# Patient Record
Sex: FEMALE | Race: AMERICAN INDIAN OR ALASKA NATIVE | Employment: OTHER | URBAN - METROPOLITAN AREA
[De-identification: names, ages, dates, MRNs, and addresses within clinical notes are randomized per-mention and may not be internally consistent; named-entity substitution may affect disease eponyms.]

---

## 2017-01-11 ENCOUNTER — HOSPITAL ENCOUNTER (OUTPATIENT)
Dept: PHYSICAL THERAPY | Age: 72
Discharge: HOME | End: 2017-01-11
Payer: MEDICARE

## 2017-01-11 DIAGNOSIS — M54.2 NECK PAIN: ICD-10-CM

## 2017-01-11 PROCEDURE — 97110 THERAPEUTIC EXERCISES: CPT

## 2017-01-11 PROCEDURE — 97162 PT EVAL MOD COMPLEX 30 MIN: CPT

## 2017-01-11 PROCEDURE — G8981 BODY POS CURRENT STATUS: HCPCS

## 2017-01-11 PROCEDURE — G8982 BODY POS GOAL STATUS: HCPCS

## 2017-01-11 NOTE — PROGRESS NOTES
Miles Whitt  : 1945 [address] [facility],[address]  [address] [place] [address].  [phone]   [phone]        OUTPATIENT PHYSICAL THERAPY:Initial Assessment 2017      ICD-10: Treatment Diagnosis: Cervicalgia (M54.2) , Other muscle spasm (P97.862)  Precautions/Allergies:   Review of patient's allergies indicates not on file. Fall Risk Score: 7 (? 5 = High Risk)  MD Orders: Eval and Treat  MEDICAL/REFERRING DIAGNOSIS:  Neck pain [M54.2]   DATE OF ONSET: week before [place]   REFERRING PHYSICIAN: Jef Campbell MD  RETURN PHYSICIAN APPOINTMENT: tbd      INITIAL ASSESSMENT:  Ms. Ilene Crowder presents to therapy with pain in the L side of the neck due to a fall outside her home. Pt stated the pain started when she fell and has not been relieved since the fall. Pt is having difficulty driving as well as with AROM of the neck for other ADLs and functional activities. Pt would benefit from skilled PT for above deficits to return to prior level of function painfree. PROBLEM LIST (Impacting functional limitations):  1. Decreased Strength  2. Decreased ADL/Functional Activities  3. Increased Pain  4. Decreased Activity Tolerance  5. Decreased Flexibility/Joint Mobility  6. Decreased [place] with Home Exercise Program INTERVENTIONS PLANNED:  1. Balance Exercise  2. Cold  3. Electrical Stimulation  4. Heat  5. Home Exercise Program (HEP)  6. Manual Therapy  7. Range of Motion (ROM)  8. Therapeutic Activites  9. Therapeutic Exercise/Strengthening  10. Ultrasound (US)   TREATMENT PLAN:  Effective Dates: 17 TO 17. Frequency/Duration: 2 times a week for 6 weeks  GOALS: (Goals have been discussed and agreed upon with patient.)  Short-Term Functional Goals: Time Frame: 2 weeks   1. Ms. Ilene Crowder to be independent with HEP. 2. Pt able to tolerate PROM in all directions without increase in symptoms. Discharge Goals: Time Frame: 6 weeks   1.  Pt able to return to prior level of function painfree in the neck. 2. Pt able to perform full AROM to return to safe driving without pain in the neck. 3. Pt able to sleep throughout night without waking with neck pain 100% of time. Rehabilitation Potential For Stated Goals: Excellent  Regarding Bhumijorge Day's therapy, I certify that the treatment plan above will be carried out by a therapist or under their direction. Thank you for this referral,  Corine Cm, PT, DPT       Referring Physician Signature: Clovis Edwards MD              Date                      HISTORY:   History of Present Injury/Illness (Reason for Referral):  Pt 66 y/o F with pain in the L side of the neck and the R arm due to a fall outside her home. Pt stated she went to the doctor and had xrays and the xrays did show any fractures but the achiness is still there almost a month later. She is having trouble turning her head and leaning over during her ADLS her neck hurts. Pt returns to the doctor next week for a check up and stated she wants to get the doctor to xray the R arm. Past Medical History/Comorbidities:   Ms. Mata Harmon  has a past medical history of Psychiatric disorder. Ms. Mata Harmon  has no past surgical history on file.   Social History/Living Environment:     Lives alone and is independent   Prior Level of Function/Work/Activity:  Retired from [employer]:         RIGHT  Other Clinical Tests:          Xray of neck (-), injection in the R shoulder (2 weeks ago)   Previous Treatment Approaches:          No previous treatment   Personal Factors:          Past/Current Experience:  Aris Born recently causing the neck pain, the R arm was prior to the fall         Overall Behavior:  High anxiety         Other factors that influence how disability is experienced by the patient:  Bipolar disorder   Current Medications:    Current Outpatient Prescriptions:     risperiDONE (RISPERDAL) 1 mg tablet, Take 1 Tab by mouth two (2) times a day., Disp: 30 Tab, Rfl: 0    diphenoxylate-atropine (LOMOTIL) 2.5-0.025 mg per tablet, Take 1 Tab by mouth four (4) times daily as needed for Diarrhea. Max Daily Amount: 4 Tabs., Disp: 28 Tab, Rfl: 0    clotrimazole-betamethasone (LOTRISONE) topical cream, Apply  to affected area two (2) times a day., Disp: 15 g, Rfl: 0    naproxen (NAPROSYN) 500 mg tablet, Take 1 Tab by mouth two (2) times daily (with meals). , Disp: 28 Tab, Rfl: 0    LORazepam (ATIVAN) 0.5 mg tablet, Take 1 Tab by mouth every eight (8) hours as needed for Anxiety. Max Daily Amount: 1.5 mg. Indications: ANXIETY, Disp: 90 Tab, Rfl: 5    benztropine (COGENTIN) 1 mg tablet, Take one tablet po q hs, Disp: 90 Tab, Rfl: 3    escitalopram oxalate (LEXAPRO) 20 mg tablet, Take 1 Tab by mouth daily. Indications: ANXIETY WITH DEPRESSION, Disp: 90 Tab, Rfl: 3    conjugated estrogens (PREMARIN) 0.3 mg tablet, Take 1 Tab by mouth daily. , Disp: 90 Tab, Rfl: 3    escitalopram oxalate (LEXAPRO) 10 mg tablet, , Disp: , Rfl:     fluorometholone (FML) 0.1 % ophthalmic suspension, , Disp: , Rfl:     latanoprost (XALATAN) 0.005 % ophthalmic solution, , Disp: , Rfl:    Date Last Reviewed:  1/11/2017   # of Personal Factors/Comorbidities that affect the Plan of Care: 3+: HIGH COMPLEXITY   EXAMINATION:   Observation/Orthostatic Postural Assessment:          Normal   Palpation:          No tenderness to touch   ROM:     Cervical       Flexion: 35 ° (painful)       Extension: 50 °       R SB: 30 °       L SB: 30 °       R rotation: 25 °       L rotation: 25 ° (painful)    Shoulder   Strength:     Cervical      Flexion: overall 4+/5       Extension:      R SB:      L SB:      R rotation:      L rotation:   Shoulder    Special Tests:          Compression (-), Distraction (-), Spurlings (-), Valsalva (-),   Neurological Screen:        Sensation: no complaint of numbness or tingling   Functional Mobility:         Independent   Balance:          Good    Body Structures Involved:  1. Muscles Body Functions Affected:  1. Sensory/Pain  2. Neuromusculoskeletal  3. Movement Related Activities and Participation Affected:  1. Mobility  2. Community, Social and [place]   # of elements that affect the Plan of Care: 4+: HIGH COMPLEXITY   CLINICAL PRESENTATION:   Presentation: Evolving clinical presentation with changing clinical characteristics: MODERATE COMPLEXITY   CLINICAL DECISION MAKING:   Outcome Measure: Tool Used: Neck Disability Index (NDI)  Score:  Initial: 11/50  Most Recent: X/50 (Date: -- )   Interpretation of Score: The Neck Disability Index is a revised form of the Oswestry Low Back Pain Index and is designed to measure the activities of daily living in person's with neck pain. Each section is scored on a 0-5 scale, 5 representing the greatest disability. The scores of each section are added together for a total score of 50. Score 0 1-10 11-20 21-30 31-40 41-49 50   Modifier CH CI CJ CK CL CM CN     ? Changing and Maintaining Body Position:    [id number] - CURRENT STATUS: CJ - 20%-39% impaired, limited or restricted    - GOAL STATUS: CI - 1%-19% impaired, limited or restricted    - D/C STATUS:  ---------------To be determined---------------    Medical Necessity:   · Patient is expected to demonstrate progress in strength and range of motion to increase independence with functional and daily activities. .  Reason for Services/Other Comments:  · Patient continues to require present interventions due to patient's inability to perform functional activities painfree. .   Use of outcome tool(s) and clinical judgement create a POC that gives a: Questionable prediction of patient's progress: MODERATE COMPLEXITY   TREATMENT:   (In addition to Assessment/Re-Assessment sessions the following treatments were rendered)  THERAPEUTIC EXERCISE: (see below for minutes):  Exercises per grid below to improve mobility and strength.   Required minimal visual cues to promote proper body alignment, promote proper body posture and promote proper body mechanics. Progressed resistance, range and repetitions as indicated. MANUAL THERAPY: (see below for minutes): Joint mobilization and Soft tissue mobilization was utilized and necessary because of the patient's restricted joint motion, painful spasm and restricted motion of soft tissue. MODALITIES: (see below for minutes):      none used today. Date: 1-11-17       Modalities:                                Therapeutic Exercise: 15 mins        Upper trap stretch  x10SH       Cervical retraction in sitting  x20       Levator stretch  x10SH       Corner stretch  x10SH                       Proprioceptive Activities:                                Manual Therapy:                        Functional Activities:                                        HEP: Pt was given the above stretches at for HEP and educated on all exercises as well as using a HP for pain relief. Treatment/Session Assessment:  Pt seems to be a little more concerned than she should be due to high anxiety and history of bipolar disorder but is able to tolerate all stretches and is able to perform the exercises independently. Pt would benefit from STM, manual techniques, stretching and modalities for pain relief. · Pain/ Symptoms: Initial:   0/10 at rest  Post Session:  0/10 ·   Compliance with Program/Exercises: Will assess as treatment progresses. · Recommendations/Intent for next treatment session: \"Next visit will focus on advancements to more challenging activities\".   Total Treatment Duration:  PT Patient Time In/Time Out  Time In: 1015  Time Out: Justino Wang, PT, DPT

## 2017-01-11 NOTE — PROGRESS NOTES
Ambulatory/Rehab Services H2 Model Falls Risk Assessment    Risk Factor Pts. ·   Confusion/Disorientation/Impulsivity  []    4 ·   Symptomatic Depression  [x]   2 ·   Altered Elimination  []   1 ·   Dizziness/Vertigo  []   1 ·   Gender (Male)  []   1 ·   Any administered antiepileptics (anticonvulsants):  []   2 ·   Any administered benzodiazepines:  []   1 ·   Visual Impairment (specify):  []   1 ·   Portable Oxygen Use  []   1 ·   Orthostatic ? BP  []   1 ·   History of Recent Falls (within 3 mos.)  [x]   5     Ability to Rise from Chair (choose one) Pts. ·   Ability to rise in a single movement  [x]   0 ·   Pushes up, successful in one attempt  []   1 ·   Multiple attempts, but successful  []   3 ·   Unable to rise without assistance  []   4   Total: (5 or greater = High Risk) 7     Falls Prevention Plan:   []                Physical Limitations to Exercise (specify):   []                Mobility Assistance Device (type):   []                Exercise/Equipment Adaptation (specify):    ©2010 [facility] of [address] Patent #[id number].  Federal Law prohibits the replication, distribution or use without written permission from [facility] [employer]

## 2017-01-13 ENCOUNTER — HOSPITAL ENCOUNTER (OUTPATIENT)
Dept: PHYSICAL THERAPY | Age: 72
Discharge: HOME | End: 2017-01-13
Payer: MEDICARE

## 2017-01-13 PROCEDURE — 97110 THERAPEUTIC EXERCISES: CPT

## 2017-01-13 PROCEDURE — 97014 ELECTRIC STIMULATION THERAPY: CPT

## 2017-01-13 NOTE — PROGRESS NOTES
Namrata Gipson  : 1945 [address] [facility],[address]  [address] [place] [address].  [phone]   [phone]        OUTPATIENT PHYSICAL THERAPY:Daily Note 2017      ICD-10: Treatment Diagnosis: Cervicalgia (M54.2) , Other muscle spasm (S76.783)  Precautions/Allergies:   Review of patient's allergies indicates not on file. Fall Risk Score: 7 (? 5 = High Risk)  MD Orders: Eval and Treat  MEDICAL/REFERRING DIAGNOSIS:  Cervicalgia [M54.2]   DATE OF ONSET: week before [place]   REFERRING PHYSICIAN: Andria Chauhan MD  RETURN PHYSICIAN APPOINTMENT: tbd      INITIAL ASSESSMENT:  Ms. Sushil Lorenzo presents to therapy with pain in the L side of the neck due to a fall outside her home. Pt stated the pain started when she fell and has not been relieved since the fall. Pt is having difficulty driving as well as with AROM of the neck for other ADLs and functional activities. Pt would benefit from skilled PT for above deficits to return to prior level of function painfree. PROBLEM LIST (Impacting functional limitations):  1. Decreased Strength  2. Decreased ADL/Functional Activities  3. Increased Pain  4. Decreased Activity Tolerance  5. Decreased Flexibility/Joint Mobility  6. Decreased [place] with Home Exercise Program INTERVENTIONS PLANNED:  1. Balance Exercise  2. Cold  3. Electrical Stimulation  4. Heat  5. Home Exercise Program (HEP)  6. Manual Therapy  7. Range of Motion (ROM)  8. Therapeutic Activites  9. Therapeutic Exercise/Strengthening  10. Ultrasound (US)   TREATMENT PLAN:  Effective Dates: 17 TO 17. Frequency/Duration: 2 times a week for 6 weeks  GOALS: (Goals have been discussed and agreed upon with patient.)  Short-Term Functional Goals: Time Frame: 2 weeks   1. Ms. Sushil Lorenzo to be independent with HEP. 2. Pt able to tolerate PROM in all directions without increase in symptoms. Discharge Goals: Time Frame: 6 weeks   1.  Pt able to return to prior level of function painfree in the neck. 2. Pt able to perform full AROM to return to safe driving without pain in the neck. 3. Pt able to sleep throughout night without waking with neck pain 100% of time. Rehabilitation Potential For Stated Goals: Excellent                HISTORY:   History of Present Injury/Illness (Reason for Referral):  Pt 68 y/o F with pain in the L side of the neck and the R arm due to a fall outside her home. Pt stated she went to the doctor and had xrays and the xrays did show any fractures but the achiness is still there almost a month later. She is having trouble turning her head and leaning over during her ADLS her neck hurts. Pt returns to the doctor next week for a check up and stated she wants to get the doctor to xray the R arm. Past Medical History/Comorbidities:   Ms. Owen Mcgrath  has a past medical history of Psychiatric disorder. Ms. Owen Mcgrath  has no past surgical history on file. Social History/Living Environment:     Lives alone and is independent   Prior Level of Function/Work/Activity:  Retired from [employer]:         RIGHT  Other Clinical Tests:          Xray of neck (-), injection in the R shoulder (2 weeks ago)   Previous Treatment Approaches:          No previous treatment   Personal Factors:          Past/Current Experience:  Birda Bellow recently causing the neck pain, the R arm was prior to the fall         Overall Behavior:  High anxiety         Other factors that influence how disability is experienced by the patient:  Bipolar disorder   Current Medications:    Current Outpatient Prescriptions:     risperiDONE (RISPERDAL) 1 mg tablet, Take 1 Tab by mouth two (2) times a day., Disp: 30 Tab, Rfl: 0    diphenoxylate-atropine (LOMOTIL) 2.5-0.025 mg per tablet, Take 1 Tab by mouth four (4) times daily as needed for Diarrhea.  Max Daily Amount: 4 Tabs., Disp: 28 Tab, Rfl: 0    clotrimazole-betamethasone (LOTRISONE) topical cream, Apply  to affected area two (2) times a day., Disp: 15 g, Rfl: 0    naproxen (NAPROSYN) 500 mg tablet, Take 1 Tab by mouth two (2) times daily (with meals). , Disp: 28 Tab, Rfl: 0    LORazepam (ATIVAN) 0.5 mg tablet, Take 1 Tab by mouth every eight (8) hours as needed for Anxiety. Max Daily Amount: 1.5 mg. Indications: ANXIETY, Disp: 90 Tab, Rfl: 5    benztropine (COGENTIN) 1 mg tablet, Take one tablet po q hs, Disp: 90 Tab, Rfl: 3    escitalopram oxalate (LEXAPRO) 20 mg tablet, Take 1 Tab by mouth daily. Indications: ANXIETY WITH DEPRESSION, Disp: 90 Tab, Rfl: 3    conjugated estrogens (PREMARIN) 0.3 mg tablet, Take 1 Tab by mouth daily. , Disp: 90 Tab, Rfl: 3    escitalopram oxalate (LEXAPRO) 10 mg tablet, , Disp: , Rfl:     fluorometholone (FML) 0.1 % ophthalmic suspension, , Disp: , Rfl:     latanoprost (XALATAN) 0.005 % ophthalmic solution, , Disp: , Rfl:    Date Last Reviewed:  1/13/2017   EXAMINATION:   Observation/Orthostatic Postural Assessment:          Normal   Palpation:          No tenderness to touch   ROM:     Cervical       Flexion: 35 ° (painful)       Extension: 50 °       R SB: 30 °       L SB: 30 °       R rotation: 25 °       L rotation: 25 ° (painful)    Shoulder   Strength:     Cervical      Flexion: overall 4+/5       Extension:      R SB:      L SB:      R rotation:      L rotation:   Shoulder    Special Tests:          Compression (-), Distraction (-), Spurlings (-), Valsalva (-),   Neurological Screen:        Sensation: no complaint of numbness or tingling   Functional Mobility:         Independent   Balance:          Good    Body Structures Involved:  1. Muscles Body Functions Affected:  1. Sensory/Pain  2. Neuromusculoskeletal  3. Movement Related Activities and Participation Affected:  1. Mobility  2. Community, Social and Civic Life   CLINICAL PRESENTATION:   CLINICAL DECISION MAKING:   Outcome Measure:    Tool Used: Neck Disability Index (NDI)  Score:  Initial: 11/50  Most Recent: X/50 (Date: -- )   Interpretation of Score: The Neck Disability Index is a revised form of the Oswestry Low Back Pain Index and is designed to measure the activities of daily living in person's with neck pain. Each section is scored on a 0-5 scale, 5 representing the greatest disability. The scores of each section are added together for a total score of 50. Score 0 1-10 11-20 21-30 31-40 41-49 50   Modifier CH CI CJ CK CL CM CN     ? Changing and Maintaining Body Position:    [id number] - CURRENT STATUS: CJ - 20%-39% impaired, limited or restricted    - GOAL STATUS: CI - 1%-19% impaired, limited or restricted    - D/C STATUS:  ---------------To be determined---------------    Medical Necessity:   · Patient is expected to demonstrate progress in strength and range of motion to increase independence with functional and daily activities. .  Reason for Services/Other Comments:  · Patient continues to require present interventions due to patient's inability to perform functional activities painfree. .   TREATMENT:   (In addition to Assessment/Re-Assessment sessions the following treatments were rendered)  THERAPEUTIC EXERCISE: (see below for minutes):  Exercises per grid below to improve mobility and strength. Required minimal visual cues to promote proper body alignment, promote proper body posture and promote proper body mechanics. Progressed resistance, range and repetitions as indicated. MANUAL THERAPY: (see below for minutes): Joint mobilization and Soft tissue mobilization was utilized and necessary because of the patient's restricted joint motion, painful spasm and restricted motion of soft tissue. MODALITIES: (see below for minutes):      none used today.         Date: 1-11-17 1-13-17      Modalities:  15 mins       IFC with HP   Prior to tx x 15 mins                       Therapeutic Exercise: 15 mins  30 mins       Upper trap stretch  x10SH 3x10SH bilateral       Cervical retraction in sitting  x20 x30 Levator stretch  x10SH 3x10SH bilateral       Corner stretch  x10SH 3x10SH       UBE   5 mins forward L2       Resisted shoulder extension   Green band 3x10       scap retraction sitting   x30       Proprioceptive Activities:                                Manual Therapy:                        Functional Activities:                                        HEP: Pt was given the above stretches at for HEP and educated on all exercises as well as using a HP for pain relief. Treatment/Session Assessment:  Pt stated she was unable to do her exercises the past few days but would be able to do them over the weekend. Continue with POC. · Pain/ Symptoms: Initial:  0/10 at rest    \"My R arm is feeling better but the L neck is still stiff when i brush my teeth. \"  Post Session:  0/10 ·   Compliance with Program/Exercises: Will assess as treatment progresses. · Recommendations/Intent for next treatment session: \"Next visit will focus on advancements to more challenging activities\".   Total Treatment Duration:  PT Patient Time In/Time Out  Time In: 1015  Time Out: Justino 60, PT, DPT

## 2017-01-16 ENCOUNTER — HOSPITAL ENCOUNTER (OUTPATIENT)
Dept: PHYSICAL THERAPY | Age: 72
Discharge: HOME | End: 2017-01-16
Payer: MEDICARE

## 2017-01-16 PROCEDURE — 97110 THERAPEUTIC EXERCISES: CPT

## 2017-01-16 PROCEDURE — 97014 ELECTRIC STIMULATION THERAPY: CPT

## 2017-01-16 NOTE — PROGRESS NOTES
Calista Kaufman  : 1945 [address] [facility] [address]  [address] [place] [address]  [phone]   [phone]        OUTPATIENT PHYSICAL THERAPY:Daily Note 2017      ICD-10: Treatment Diagnosis: Cervicalgia (M54.2) , Other muscle spasm (U49.295)  Precautions/Allergies:   Review of patient's allergies indicates not on file. Fall Risk Score: 7 (? 5 = High Risk)  MD Orders: Eval and Treat  MEDICAL/REFERRING DIAGNOSIS:  Cervicalgia [M54.2]   DATE OF ONSET: week before [place]   REFERRING PHYSICIAN: Roberto Carlos aShu MD  RETURN PHYSICIAN APPOINTMENT: tbd      INITIAL ASSESSMENT:  Ms. Shahida Husain presents to therapy with pain in the L side of the neck due to a fall outside her home. Pt stated the pain started when she fell and has not been relieved since the fall. Pt is having difficulty driving as well as with AROM of the neck for other ADLs and functional activities. Pt would benefit from skilled PT for above deficits to return to prior level of function painfree. PROBLEM LIST (Impacting functional limitations):  1. Decreased Strength  2. Decreased ADL/Functional Activities  3. Increased Pain  4. Decreased Activity Tolerance  5. Decreased Flexibility/Joint Mobility  6. Decreased [place] with Home Exercise Program INTERVENTIONS PLANNED:  1. Balance Exercise  2. Cold  3. Electrical Stimulation  4. Heat  5. Home Exercise Program (HEP)  6. Manual Therapy  7. Range of Motion (ROM)  8. Therapeutic Activites  9. Therapeutic Exercise/Strengthening  10. Ultrasound (US)   TREATMENT PLAN:  Effective Dates: 17 TO 17. Frequency/Duration: 2 times a week for 6 weeks  GOALS: (Goals have been discussed and agreed upon with patient.)  Short-Term Functional Goals: Time Frame: 2 weeks   1. Ms. Shahida Husain to be independent with HEP. 2. Pt able to tolerate PROM in all directions without increase in symptoms. Discharge Goals: Time Frame: 6 weeks   1.  Pt able to return to prior level of function painfree in the neck. 2. Pt able to perform full AROM to return to safe driving without pain in the neck. 3. Pt able to sleep throughout night without waking with neck pain 100% of time. Rehabilitation Potential For Stated Goals: Excellent                HISTORY:   History of Present Injury/Illness (Reason for Referral):  Pt 66 y/o F with pain in the L side of the neck and the R arm due to a fall outside her home. Pt stated she went to the doctor and had xrays and the xrays did show any fractures but the achiness is still there almost a month later. She is having trouble turning her head and leaning over during her ADLS her neck hurts. Pt returns to the doctor next week for a check up and stated she wants to get the doctor to xray the R arm. Past Medical History/Comorbidities:   Ms. Yolanda Forman  has a past medical history of Psychiatric disorder. Ms. Yolanda Forman  has no past surgical history on file. Social History/Living Environment:     Lives alone and is independent   Prior Level of Function/Work/Activity:  Retired from [employer] [employer]:         RIGHT  Other Clinical Tests:          Xray of neck (-), injection in the R shoulder (2 weeks ago)   Previous Treatment Approaches:          No previous treatment   Personal Factors:          Past/Current Experience:  [place] recently causing the neck pain, the R arm was prior to the fall         Overall Behavior:  High anxiety         Other factors that influence how disability is experienced by the patient:  Bipolar disorder   Current Medications:    Current Outpatient Prescriptions:     risperiDONE (RISPERDAL) 1 mg tablet, Take 1 Tab by mouth two (2) times a day., Disp: 30 Tab, Rfl: 0    diphenoxylate-atropine (LOMOTIL) 2.5-0.025 mg per tablet, Take 1 Tab by mouth four (4) times daily as needed for Diarrhea.  Max Daily Amount: 4 Tabs., Disp: 28 Tab, Rfl: 0    clotrimazole-betamethasone (LOTRISONE) topical cream, Apply  to affected area two (2) times a day., Disp: 15 g, Rfl: 0    naproxen (NAPROSYN) 500 mg tablet, Take 1 Tab by mouth two (2) times daily (with meals). , Disp: 28 Tab, Rfl: 0    LORazepam (ATIVAN) 0.5 mg tablet, Take 1 Tab by mouth every eight (8) hours as needed for Anxiety. Max Daily Amount: 1.5 mg. Indications: ANXIETY, Disp: 90 Tab, Rfl: 5    benztropine (COGENTIN) 1 mg tablet, Take one tablet po q hs, Disp: 90 Tab, Rfl: 3    escitalopram oxalate (LEXAPRO) 20 mg tablet, Take 1 Tab by mouth daily. Indications: ANXIETY WITH DEPRESSION, Disp: 90 Tab, Rfl: 3    conjugated estrogens (PREMARIN) 0.3 mg tablet, Take 1 Tab by mouth daily. , Disp: 90 Tab, Rfl: 3    escitalopram oxalate (LEXAPRO) 10 mg tablet, , Disp: , Rfl:     fluorometholone (FML) 0.1 % ophthalmic suspension, , Disp: , Rfl:     latanoprost (XALATAN) 0.005 % ophthalmic solution, , Disp: , Rfl:    Date Last Reviewed:  1/16/2017   EXAMINATION:   Observation/Orthostatic Postural Assessment:          Normal   Palpation:          No tenderness to touch   ROM:     Cervical       Flexion: 35 ° (painful)       Extension: 50 °       R SB: 30 °       L SB: 30 °       R rotation: 25 °       L rotation: 25 ° (painful)    Shoulder   Strength:     Cervical      Flexion: overall 4+/5       Extension:      R SB:      L SB:      R rotation:      L rotation:   Shoulder    Special Tests:          Compression (-), Distraction (-), Spurlings (-), Valsalva (-),   Neurological Screen:        Sensation: no complaint of numbness or tingling   Functional Mobility:         Independent   Balance:          Good    Body Structures Involved:  1. Muscles Body Functions Affected:  1. Sensory/Pain  2. Neuromusculoskeletal  3. Movement Related Activities and Participation Affected:  1. Mobility  2. Community, Social and Civic Life   CLINICAL PRESENTATION:   CLINICAL DECISION MAKING:   Outcome Measure:    Tool Used: Neck Disability Index (NDI)  Score:  Initial: 11/50  Most Recent: X/50 (Date: -- )   Interpretation of Score: The Neck Disability Index is a revised form of the Oswestry Low Back Pain Index and is designed to measure the activities of daily living in person's with neck pain. Each section is scored on a 0-5 scale, 5 representing the greatest disability. The scores of each section are added together for a total score of 50. Score 0 1-10 11-20 21-30 31-40 41-49 50   Modifier CH CI CJ CK CL CM CN     ? Changing and Maintaining Body Position:    [id number] - CURRENT STATUS: CJ - 20%-39% impaired, limited or restricted    - GOAL STATUS: CI - 1%-19% impaired, limited or restricted    - D/C STATUS:  ---------------To be determined---------------    Medical Necessity:   · Patient is expected to demonstrate progress in strength and range of motion to increase independence with functional and daily activities. .  Reason for Services/Other Comments:  · Patient continues to require present interventions due to patient's inability to perform functional activities painfree. .   TREATMENT:   (In addition to Assessment/Re-Assessment sessions the following treatments were rendered)  THERAPEUTIC EXERCISE: (see below for minutes):  Exercises per grid below to improve mobility and strength. Required minimal visual cues to promote proper body alignment, promote proper body posture and promote proper body mechanics. Progressed resistance, range and repetitions as indicated. MANUAL THERAPY: (see below for minutes): Joint mobilization and Soft tissue mobilization was utilized and necessary because of the patient's restricted joint motion, painful spasm and restricted motion of soft tissue. MODALITIES: (see below for minutes): *  Electrical Stimulation Therapy (15 mins ) was provided with intensity adjusted throughout treatment to patient tolerance. with HP following treatment.         Date: 1-11-17 1-13-17 1-16-17     Modalities:  15 mins  15 mins      IFC with HP   Prior to tx x 15 mins Repeat following tx                      Therapeutic Exercise: 15 mins  30 mins  30 mins      Upper trap stretch  x10SH 3x10SH bilateral  Repeat      Cervical retraction in sitting  x20 x30  Repeat      Levator stretch  x10SH 3x10SH bilateral  Repeat      Corner stretch  x10SH 3x10SH  4x10SH      UBE   5 mins forward L2  6 mins L3 forward      Resisted shoulder extension   Green band 3x10  Repeat with blue band      scap retraction sitting   x30  Resisted with red band 3x10      Proprioceptive Activities:                                Manual Therapy:                        Functional Activities:                                        HEP: Pt was given the above stretches at for HEP and educated on all exercises as well as using a HP for pain relief. Treatment/Session Assessment:  Pt tolerated all exercises today and had IFC following tx for relaxation. Continue with POC. · Pain/ Symptoms: Initial:  0/10 at rest    \"I did all my exercises yesterday but i was a little stiff. \"  Post Session:  0/10 ·   Compliance with Program/Exercises: Will assess as treatment progresses. · Recommendations/Intent for next treatment session: \"Next visit will focus on advancements to more challenging activities\".   Total Treatment Duration:  PT Patient Time In/Time Out  Time In: 0930  Time Out: Wiley Paniagua [address]., PT, DPT

## 2017-01-18 ENCOUNTER — HOSPITAL ENCOUNTER (OUTPATIENT)
Dept: PHYSICAL THERAPY | Age: 72
Discharge: HOME | End: 2017-01-18
Payer: MEDICARE

## 2017-01-18 PROCEDURE — 97110 THERAPEUTIC EXERCISES: CPT

## 2017-01-18 PROCEDURE — 97014 ELECTRIC STIMULATION THERAPY: CPT

## 2017-01-18 PROCEDURE — 97140 MANUAL THERAPY 1/> REGIONS: CPT

## 2017-01-18 NOTE — PROGRESS NOTES
Meghna Luu  : 1945 [address] [facility]  [address] [place] [address]  [phone]   [phone]        OUTPATIENT PHYSICAL THERAPY:Daily Note 2017      ICD-10: Treatment Diagnosis: Cervicalgia (M54.2) , Other muscle spasm (U68.219)  Precautions/Allergies:   Review of patient's allergies indicates not on file. Fall Risk Score: 7 (? 5 = High Risk)  MD Orders: Eval and Treat  MEDICAL/REFERRING DIAGNOSIS:  Cervicalgia [M54.2]   DATE OF ONSET: week before [place]   REFERRING PHYSICIAN: Jose Enrique Chester MD  RETURN PHYSICIAN APPOINTMENT: tbd      INITIAL ASSESSMENT:  Ms. Shanae Woodruff presents to therapy with pain in the L side of the neck due to a fall outside her home. Pt stated the pain started when she fell and has not been relieved since the fall. Pt is having difficulty driving as well as with AROM of the neck for other ADLs and functional activities. Pt would benefit from skilled PT for above deficits to return to prior level of function painfree. PROBLEM LIST (Impacting functional limitations):  1. Decreased Strength  2. Decreased ADL/Functional Activities  3. Increased Pain  4. Decreased Activity Tolerance  5. Decreased Flexibility/Joint Mobility  6. Decreased [place] with Home Exercise Program INTERVENTIONS PLANNED:  1. Balance Exercise  2. Cold  3. Electrical Stimulation  4. Heat  5. Home Exercise Program (HEP)  6. Manual Therapy  7. Range of Motion (ROM)  8. Therapeutic Activites  9. Therapeutic Exercise/Strengthening  10. Ultrasound (US)   TREATMENT PLAN:  Effective Dates: 17 TO 17. Frequency/Duration: 2 times a week for 6 weeks  GOALS: (Goals have been discussed and agreed upon with patient.)  Short-Term Functional Goals: Time Frame: 2 weeks   1. Ms. Shanae Woodruff to be independent with HEP. 2. Pt able to tolerate PROM in all directions without increase in symptoms. Discharge Goals: Time Frame: 6 weeks   1.  Pt able to return to prior level of function painfree in the neck. 2. Pt able to perform full AROM to return to safe driving without pain in the neck. 3. Pt able to sleep throughout night without waking with neck pain 100% of time. Rehabilitation Potential For Stated Goals: Excellent                HISTORY:   History of Present Injury/Illness (Reason for Referral):  Pt 66 y/o F with pain in the L side of the neck and the R arm due to a fall outside her home. Pt stated she went to the doctor and had xrays and the xrays did show any fractures but the achiness is still there almost a month later. She is having trouble turning her head and leaning over during her ADLS her neck hurts. Pt returns to the doctor next week for a check up and stated she wants to get the doctor to xray the R arm. Past Medical History/Comorbidities:   Ms. Sushil Lorenzo  has a past medical history of Psychiatric disorder. Ms. Sushil Lorenzo  has no past surgical history on file. Social History/Living Environment:     Lives alone and is independent   Prior Level of Function/Work/Activity:  Retired from [employer]:         RIGHT  Other Clinical Tests:          Xray of neck (-), injection in the R shoulder (2 weeks ago)   Previous Treatment Approaches:          No previous treatment   Personal Factors:          Past/Current Experience:  [place] recently causing the neck pain, the R arm was prior to the fall         Overall Behavior:  High anxiety         Other factors that influence how disability is experienced by the patient:  Bipolar disorder   Current Medications:    Current Outpatient Prescriptions:     risperiDONE (RISPERDAL) 1 mg tablet, Take 1 Tab by mouth two (2) times a day., Disp: 30 Tab, Rfl: 0    diphenoxylate-atropine (LOMOTIL) 2.5-0.025 mg per tablet, Take 1 Tab by mouth four (4) times daily as needed for Diarrhea.  Max Daily Amount: 4 Tabs., Disp: 28 Tab, Rfl: 0    clotrimazole-betamethasone (LOTRISONE) topical cream, Apply  to affected area two (2) times a day., Disp: 15 g, Rfl: 0    naproxen (NAPROSYN) 500 mg tablet, Take 1 Tab by mouth two (2) times daily (with meals). , Disp: 28 Tab, Rfl: 0    LORazepam (ATIVAN) 0.5 mg tablet, Take 1 Tab by mouth every eight (8) hours as needed for Anxiety. Max Daily Amount: 1.5 mg. Indications: ANXIETY, Disp: 90 Tab, Rfl: 5    benztropine (COGENTIN) 1 mg tablet, Take one tablet po q hs, Disp: 90 Tab, Rfl: 3    escitalopram oxalate (LEXAPRO) 20 mg tablet, Take 1 Tab by mouth daily. Indications: ANXIETY WITH DEPRESSION, Disp: 90 Tab, Rfl: 3    conjugated estrogens (PREMARIN) 0.3 mg tablet, Take 1 Tab by mouth daily. , Disp: 90 Tab, Rfl: 3    escitalopram oxalate (LEXAPRO) 10 mg tablet, , Disp: , Rfl:     fluorometholone (FML) 0.1 % ophthalmic suspension, , Disp: , Rfl:     latanoprost (XALATAN) 0.005 % ophthalmic solution, , Disp: , Rfl:    Date Last Reviewed:  1/18/2017   EXAMINATION:   Observation/Orthostatic Postural Assessment:          Normal   Palpation:          No tenderness to touch   ROM:     Cervical       Flexion: 35 ° (painful)       Extension: 50 °       R SB: 30 °       L SB: 30 °       R rotation: 25 °       L rotation: 25 ° (painful)    Shoulder   Strength:     Cervical      Flexion: overall 4+/5       Extension:      R SB:      L SB:      R rotation:      L rotation:   Shoulder    Special Tests:          Compression (-), Distraction (-), Spurlings (-), Valsalva (-),   Neurological Screen:        Sensation: no complaint of numbness or tingling   Functional Mobility:         Independent   Balance:          Good    Body Structures Involved:  1. Muscles Body Functions Affected:  1. Sensory/Pain  2. Neuromusculoskeletal  3. Movement Related Activities and Participation Affected:  1. Mobility  2. Community, Social and Civic Life   CLINICAL PRESENTATION:   CLINICAL DECISION MAKING:   Outcome Measure:    Tool Used: Neck Disability Index (NDI)  Score:  Initial: 11/50  Most Recent: X/50 (Date: -- )   Interpretation of Score: The Neck Disability Index is a revised form of the Oswestry Low Back Pain Index and is designed to measure the activities of daily living in person's with neck pain. Each section is scored on a 0-5 scale, 5 representing the greatest disability. The scores of each section are added together for a total score of 50. Score 0 1-10 11-20 21-30 31-40 41-49 50   Modifier CH CI CJ CK CL CM CN     ? Changing and Maintaining Body Position:    [id number] - CURRENT STATUS: CJ - 20%-39% impaired, limited or restricted    - GOAL STATUS: CI - 1%-19% impaired, limited or restricted    - D/C STATUS:  ---------------To be determined---------------    Medical Necessity:   · Patient is expected to demonstrate progress in strength and range of motion to increase independence with functional and daily activities. .  Reason for Services/Other Comments:  · Patient continues to require present interventions due to patient's inability to perform functional activities painfree. .   TREATMENT:   (In addition to Assessment/Re-Assessment sessions the following treatments were rendered)  THERAPEUTIC EXERCISE: (see below for minutes):  Exercises per grid below to improve mobility and strength. Required minimal visual cues to promote proper body alignment, promote proper body posture and promote proper body mechanics. Progressed resistance, range and repetitions as indicated. MANUAL THERAPY: (see below for minutes): Joint mobilization and Soft tissue mobilization was utilized and necessary because of the patient's restricted joint motion, painful spasm and restricted motion of soft tissue.    MODALITIES: (see below for minutes): electrical stimulation       Date: 1-11-17 1-13-17 1-16-17 1-18-17    Modalities:  15 mins  15 mins  15 min    IFC with HP   Prior to tx x 15 mins  Repeat following tx  15 min L cervical spine                    Therapeutic Exercise: 15 mins  30 mins  30 mins  30 min Upper trap stretch  x10SH 3x10SH bilateral  Repeat      Cervical retraction in sitting  x20 x30  Repeat      Levator stretch  x10SH 3x10SH bilateral  Repeat      Corner stretch  x10SH 3x10SH  4x10SH      UBE   5 mins forward L2  6 mins L3 forward  6 min L3 forward    Resisted shoulder extension   Green band 3x10  Repeat with blue band      pendulums    x30 each R UE    Pulleys (flexion)    x20    Finger ladder    2x5    scap retraction sitting   x30  Resisted with red band 3x10      Proprioceptive Activities:                                Manual Therapy:    10 min    Cervical PROM    10 min with gentle cx distraction            Functional Activities:                                        HEP: Pt was given the above stretches at for HEP and educated on all exercises as well as using a HP for pain relief. Treatment/Session Assessment:  Pt reported increased pain with exercise. Continue with POC. · Pain/ Symptoms: Initial:  5/10   \"I had 8/10 pain in the arm yesterday. I went to the doctor for a x-ray but I don't know the results yet. \" Post Session:  0/10 ·   Compliance with Program/Exercises: Will assess as treatment progresses. · Recommendations/Intent for next treatment session: \"Next visit will focus on advancements to more challenging activities\".   Total Treatment Duration:  PT Patient Time In/Time Out  Time In: 0930  Time Out: [address] ANNA Weller

## 2017-01-25 ENCOUNTER — HOSPITAL ENCOUNTER (OUTPATIENT)
Dept: PHYSICAL THERAPY | Age: 72
Discharge: HOME | End: 2017-01-25
Payer: MEDICARE

## 2017-02-23 NOTE — PROGRESS NOTES
Flo Miranda  : 1945 [id number] [facility] [address]  [address] [place] [address]  [phone]   [phone]        OUTPATIENT PHYSICAL THERAPY:Discontinuation Summary 2017      ICD-10: Treatment Diagnosis: Cervicalgia (M54.2) , Other muscle spasm (S43.757)  Precautions/Allergies:   Review of patient's allergies indicates not on file. Fall Risk Score: 7 (? 5 = High Risk)  MD Orders: Eval and Treat  MEDICAL/REFERRING DIAGNOSIS:  Cervicalgia [M54.2]   DATE OF ONSET: week before [place]   REFERRING PHYSICIAN: Lanette Arrieta MD  RETURN PHYSICIAN APPOINTMENT: tbd      INITIAL ASSESSMENT:  Ms. Jonathon Valle presents to therapy with pain in the L side of the neck due to a fall outside her home. Pt stated the pain started when she fell and has not been relieved since the fall. Pt is having difficulty driving as well as with AROM of the neck for other ADLs and functional activities. Pt would benefit from skilled PT for above deficits to return to prior level of function painfree. PROBLEM LIST (Impacting functional limitations):  1. Decreased Strength  2. Decreased ADL/Functional Activities  3. Increased Pain  4. Decreased Activity Tolerance  5. Decreased Flexibility/Joint Mobility  6. Decreased [place] with Home Exercise Program INTERVENTIONS PLANNED:  1. Balance Exercise  2. Cold  3. Electrical Stimulation  4. Heat  5. Home Exercise Program (HEP)  6. Manual Therapy  7. Range of Motion (ROM)  8. Therapeutic Activites  9. Therapeutic Exercise/Strengthening  10. Ultrasound (US)   TREATMENT PLAN:  Effective Dates: 17 TO 17. Frequency/Duration: 2 times a week for 6 weeks  GOALS: (Goals have been discussed and agreed upon with patient.)  Short-Term Functional Goals: Time Frame: 2 weeks   1. Ms. Jonathon Valle to be independent with HEP. 2. Pt able to tolerate PROM in all directions without increase in symptoms. Discharge Goals: Time Frame: 6 weeks   1.  Pt able to return to prior level of function painfree in the neck. 2. Pt able to perform full AROM to return to safe driving without pain in the neck. 3. Pt able to sleep throughout night without waking with neck pain 100% of time. Rehabilitation Potential For Stated Goals: Excellent                HISTORY:   History of Present Injury/Illness (Reason for Referral):  Pt 68 y/o F with pain in the L side of the neck and the R arm due to a fall outside her home. Pt stated she went to the doctor and had xrays and the xrays did show any fractures but the achiness is still there almost a month later. She is having trouble turning her head and leaning over during her ADLS her neck hurts. Pt returns to the doctor next week for a check up and stated she wants to get the doctor to xray the R arm. Past Medical History/Comorbidities:   Ms. Moses Rizo  has a past medical history of Psychiatric disorder. Ms. Moses Rizo  has no past surgical history on file. Social History/Living Environment:     Lives alone and is independent   Prior Level of Function/Work/Activity:  Retired from [employer]         RIGHT  Other Clinical Tests:          Xray of neck (-), injection in the R shoulder (2 weeks ago)   Previous Treatment Approaches:          No previous treatment   Personal Factors:          Past/Current Experience:  Nadine Wheatley recently causing the neck pain, the R arm was prior to the fall         Overall Behavior:  High anxiety         Other factors that influence how disability is experienced by the patient:  Bipolar disorder   Current Medications:    Current Outpatient Prescriptions:     risperiDONE (RISPERDAL) 1 mg tablet, Take 1 Tab by mouth two (2) times a day., Disp: 30 Tab, Rfl: 0    diphenoxylate-atropine (LOMOTIL) 2.5-0.025 mg per tablet, Take 1 Tab by mouth four (4) times daily as needed for Diarrhea.  Max Daily Amount: 4 Tabs., Disp: 28 Tab, Rfl: 0    clotrimazole-betamethasone (LOTRISONE) topical cream, Apply  to affected area two (2) times a day., Disp: 15 g, Rfl: 0    naproxen (NAPROSYN) 500 mg tablet, Take 1 Tab by mouth two (2) times daily (with meals). , Disp: 28 Tab, Rfl: 0    LORazepam (ATIVAN) 0.5 mg tablet, Take 1 Tab by mouth every eight (8) hours as needed for Anxiety. Max Daily Amount: 1.5 mg. Indications: ANXIETY, Disp: 90 Tab, Rfl: 5    benztropine (COGENTIN) 1 mg tablet, Take one tablet po q hs, Disp: 90 Tab, Rfl: 3    escitalopram oxalate (LEXAPRO) 20 mg tablet, Take 1 Tab by mouth daily. Indications: ANXIETY WITH DEPRESSION, Disp: 90 Tab, Rfl: 3    conjugated estrogens (PREMARIN) 0.3 mg tablet, Take 1 Tab by mouth daily. , Disp: 90 Tab, Rfl: 3    escitalopram oxalate (LEXAPRO) 10 mg tablet, , Disp: , Rfl:     fluorometholone (FML) 0.1 % ophthalmic suspension, , Disp: , Rfl:     latanoprost (XALATAN) 0.005 % ophthalmic solution, , Disp: , Rfl:    Date Last Reviewed:  1/18/2017   EXAMINATION:   Observation/Orthostatic Postural Assessment:          Normal   Palpation:          No tenderness to touch   ROM:     Cervical       Flexion: 35 ° (painful)       Extension: 50 °       R SB: 30 °       L SB: 30 °       R rotation: 25 °       L rotation: 25 ° (painful)    Shoulder   Strength:     Cervical      Flexion: overall 4+/5       Extension:      R SB:      L SB:      R rotation:      L rotation:   Shoulder    Special Tests:          Compression (-), Distraction (-), Spurlings (-), Valsalva (-),   Neurological Screen:        Sensation: no complaint of numbness or tingling   Functional Mobility:         Independent   Balance:          Good    Body Structures Involved:  1. Muscles Body Functions Affected:  1. Sensory/Pain  2. Neuromusculoskeletal  3. Movement Related Activities and Participation Affected:  1. Mobility  2. Community, Social and Civic Life   CLINICAL PRESENTATION:   CLINICAL DECISION MAKING:   Outcome Measure:    Tool Used: Neck Disability Index (NDI)  Score:  Initial: 11/50  Most Recent: X/50 (Date: -- )   Interpretation of Score: The Neck Disability Index is a revised form of the Oswestry Low Back Pain Index and is designed to measure the activities of daily living in person's with neck pain. Each section is scored on a 0-5 scale, 5 representing the greatest disability. The scores of each section are added together for a total score of 50. Score 0 1-10 11-20 21-30 31-40 41-49 50   Modifier CH CI CJ CK CL CM CN     ? Changing and Maintaining Body Position:    [id number] - CURRENT STATUS: CJ - 20%-39% impaired, limited or restricted    - GOAL STATUS: CI - 1%-19% impaired, limited or restricted    - D/C STATUS:  ---------------To be determined---------------    Medical Necessity:   · Patient is expected to demonstrate progress in strength and range of motion to increase independence with functional and daily activities. .  Reason for Services/Other Comments:  · Patient continues to require present interventions due to patient's inability to perform functional activities painfree. .   TREATMENT:   (In addition to Assessment/Re-Assessment sessions the following treatments were rendered)  THERAPEUTIC EXERCISE: (see below for minutes):  Exercises per grid below to improve mobility and strength. Required minimal visual cues to promote proper body alignment, promote proper body posture and promote proper body mechanics. Progressed resistance, range and repetitions as indicated. MANUAL THERAPY: (see below for minutes): Joint mobilization and Soft tissue mobilization was utilized and necessary because of the patient's restricted joint motion, painful spasm and restricted motion of soft tissue.    MODALITIES: (see below for minutes): electrical stimulation       Date: 1-11-17 1-13-17 1-16-17 1-18-17    Modalities:  15 mins  15 mins  15 min    IFC with HP   Prior to tx x 15 mins  Repeat following tx  15 min L cervical spine                    Therapeutic Exercise: 15 mins  30 mins  30 mins 30 min    Upper trap stretch  x10SH 3x10SH bilateral  Repeat      Cervical retraction in sitting  x20 x30  Repeat      Levator stretch  x10SH 3x10SH bilateral  Repeat      Corner stretch  x10SH 3x10SH  4x10SH      UBE   5 mins forward L2  6 mins L3 forward  6 min L3 forward    Resisted shoulder extension   Green band 3x10  Repeat with blue band      pendulums    x30 each R UE    Pulleys (flexion)    x20    Finger ladder    2x5    scap retraction sitting   x30  Resisted with red band 3x10      Proprioceptive Activities:                                Manual Therapy:    10 min    Cervical PROM    10 min with gentle cx distraction            Functional Activities:                                        HEP: Pt was given the above stretches at for HEP and educated on all exercises as well as using a HP for pain relief. Treatment/Session Assessment: Pt did not return to therapy. Pt is discontinued.          Rosemarie Franco, PT, DPT

## 2017-06-29 ENCOUNTER — HOSPITAL ENCOUNTER (OUTPATIENT)
Dept: MAMMOGRAPHY | Age: 72
Discharge: HOME | End: 2017-06-29
Attending: FAMILY MEDICINE
Payer: MEDICARE

## 2017-06-29 DIAGNOSIS — Z12.31 VISIT FOR SCREENING MAMMOGRAM: ICD-10-CM

## 2017-06-29 PROCEDURE — 77067 SCR MAMMO BI INCL CAD: CPT

## 2018-07-31 ENCOUNTER — HOSPITAL ENCOUNTER (EMERGENCY)
Age: 73
Discharge: HOME | End: 2018-07-31
Attending: EMERGENCY MEDICINE
Payer: MEDICARE

## 2018-07-31 VITALS
TEMPERATURE: 97.4 F | DIASTOLIC BLOOD PRESSURE: 83 MMHG | HEART RATE: 93 BPM | OXYGEN SATURATION: 93 % | BODY MASS INDEX: 35.51 KG/M2 | SYSTOLIC BLOOD PRESSURE: 120 MMHG | WEIGHT: 193 LBS | RESPIRATION RATE: 22 BRPM | HEIGHT: 62 IN

## 2018-07-31 DIAGNOSIS — T78.40XA ALLERGIC REACTION, INITIAL ENCOUNTER: Primary | ICD-10-CM

## 2018-07-31 PROCEDURE — 99284 EMERGENCY DEPT VISIT MOD MDM: CPT | Performed by: EMERGENCY MEDICINE

## 2018-07-31 PROCEDURE — 96374 THER/PROPH/DIAG INJ IV PUSH: CPT | Performed by: EMERGENCY MEDICINE

## 2018-07-31 PROCEDURE — 74011250636 HC RX REV CODE- 250/636: Performed by: EMERGENCY MEDICINE

## 2018-07-31 RX ORDER — ONDANSETRON 2 MG/ML
4 INJECTION INTRAMUSCULAR; INTRAVENOUS
Status: COMPLETED | OUTPATIENT
Start: 2018-07-31 | End: 2018-07-31

## 2018-07-31 RX ADMIN — ONDANSETRON 4 MG: 2 INJECTION, SOLUTION INTRAMUSCULAR; INTRAVENOUS at 19:09

## 2018-07-31 NOTE — ED PROVIDER NOTES
HPI Comments: Here with what she feels an allergic reaction. no high risk foods. No new medications. Uncertain whether there was a bite or sting, associated with a reddened area to her right hand. Denies any wheezing or difficulty swallowing. Never had a severe allergic reaction for any reason. Patient is a 75 y.o. female presenting with allergic reaction. The history is provided by the patient and a relative. Allergic Reaction This is a new problem. The current episode started 3 to 5 hours ago. The problem has been rapidly improving. Ingested substance: unknown. Associated symptoms include nausea. Pertinent negatives include no vomiting. Past Medical History:  
Diagnosis Date  Psychiatric disorder a/d No past surgical history on file. Family History:  
Problem Relation Age of Onset  Breast Cancer Neg Hx Social History Social History  Marital status:  Spouse name: N/A  
 Number of children: N/A  
 Years of education: N/A Occupational History  Not on file. Social History Main Topics  Smoking status: Never Smoker  Smokeless tobacco: Not on file  Alcohol use No  
 Drug use: No  
 Sexual activity: Not on file Other Topics Concern  Not on file Social History Narrative ALLERGIES: Review of patient's allergies indicates no known allergies. Review of Systems Constitutional: Negative for chills and fever. Respiratory: Negative. Negative for wheezing. Cardiovascular: Negative. Gastrointestinal: Positive for nausea. Negative for vomiting. Genitourinary: Negative. All other systems reviewed and are negative. Vitals:  
 07/31/18 1543 BP: 91/74 Pulse: (!) 114 Resp: 22 Temp: 97.4 °F (36.3 °C) SpO2: 93% Weight: 87.5 kg (193 lb) Height: 5' 2\" (1.575 m) Physical Exam  
Constitutional: She appears well-developed and well-nourished. No distress. HENT:  
Head: Atraumatic.   
Eyes: No scleral icterus. Neck: Neck supple. Cardiovascular: Normal rate. Pulmonary/Chest: Effort normal. No respiratory distress. She has no wheezes. Abdominal: Soft. Skin: Skin is warm and dry. No rash noted. No erythema. No pallor. One reddened area over right index MCP region Psychiatric: Her behavior is normal. Thought content normal.  
Nursing note and vitals reviewed. MDM Number of Diagnoses or Management Options Allergic reaction, initial encounter:  
Diagnosis management comments: Plan unremarkable exam at time of evaluation. States that Benadryl given by EMS. Has resolve most of her findings. uncertain if we can identify any provoking event or  Medication. Does have suspicious area to her right hand that if it was a bite or sting may have been the provoker. Risk of Complications, Morbidity, and/or Mortality Presenting problems: moderate Diagnostic procedures: minimal 
Management options: moderate Patient Progress Patient progress: improved ED Course Procedures

## 2018-07-31 NOTE — ED NOTES
I have reviewed discharge instructions with the patient. The patient verbalized understanding. Patient left ED via Discharge Method: ambulatory to Home with spouse. Opportunity for questions and clarification provided. Patient given 0 scripts.

## 2018-07-31 NOTE — ED TRIAGE NOTES
Pt arrived via EMS with allergic reaction with hives all over BUE and trunk. Pt does not know source. No SOB but does state her tongue feels swollen. No difficulty swallowing. EMS gave 50 benadryl and pt states she is no longer itching but feels nauseated. /78  93% on 4L

## 2018-07-31 NOTE — Clinical Note
Continue Benadryl every 4-6 hours for itching Continue to follow for any insight as to provoking cause. At present, no defined cause.

## 2018-07-31 NOTE — DISCHARGE INSTRUCTIONS
Allergic Reaction: Care Instructions  Your Care Instructions    An allergic reaction is an excessive response from your immune system to a medicine, chemical, food, insect bite, or other substance. A reaction can range from mild to life-threatening. Some people have a mild rash, hives, and itching or stomach cramps. In severe reactions, swelling of your tongue and throat can close up your airway so that you cannot breathe. Follow-up care is a key part of your treatment and safety. Be sure to make and go to all appointments, and call your doctor if you are having problems. It's also a good idea to know your test results and keep a list of the medicines you take. How can you care for yourself at home? · If you know what caused your allergic reaction, be sure to avoid it. Your allergy may become more severe each time you have a reaction. · Take an over-the-counter antihistamine, such as cetirizine (Zyrtec) or loratadine (Claritin), to treat mild symptoms. Read and follow directions on the label. Some antihistamines can make you feel sleepy. Do not give antihistamines to a child unless you have checked with your doctor first. Mild symptoms include sneezing or an itchy or runny nose; an itchy mouth; a few hives or mild itching; and mild nausea or stomach discomfort. · Do not scratch hives or a rash. Put a cold, moist towel on them or take cool baths to relieve itching. Put ice packs on hives, swelling, or insect stings for 10 to 15 minutes at a time. Put a thin cloth between the ice pack and your skin. Do not take hot baths or showers. They will make the itching worse. · Your doctor may prescribe a shot of epinephrine to carry with you in case you have a severe reaction. Learn how to give yourself the shot and keep it with you at all times. Make sure it is not . · Go to the emergency room every time you have a severe reaction, even if you have used your shot of epinephrine and are feeling better. Symptoms can come back after a shot. · Wear medical alert jewelry that lists your allergies. You can buy this at most drugstores. · If your child has a severe allergy, make sure that his or her teachers, babysitters, coaches, and other caregivers know about the allergy. They should have an epinephrine shot, know how and when to give it, and have a plan to take your child to the hospital.  When should you call for help? Give an epinephrine shot if:    · You think you are having a severe allergic reaction.     · You have symptoms in more than one body area, such as mild nausea and an itchy mouth.    After giving an epinephrine shot call 911, even if you feel better.   Call 911 if:    · You have symptoms of a severe allergic reaction. These may include:  ¨ Sudden raised, red areas (hives) all over your body. ¨ Swelling of the throat, mouth, lips, or tongue. ¨ Trouble breathing. ¨ Passing out (losing consciousness). Or you may feel very lightheaded or suddenly feel weak, confused, or restless.     · You have been given an epinephrine shot, even if you feel better.    Call your doctor now or seek immediate medical care if:    · You have symptoms of an allergic reaction, such as:  ¨ A rash or hives (raised, red areas on the skin). ¨ Itching. ¨ Swelling. ¨ Belly pain, nausea, or vomiting.    Watch closely for changes in your health, and be sure to contact your doctor if:    · You do not get better as expected. Where can you learn more? Go to http://otoniel-denton.info/. Enter X577 in the search box to learn more about \"Allergic Reaction: Care Instructions. \"  Current as of: October 6, 2017  Content Version: 11.7  © 9036-1278 [employer]. Care instructions adapted under license by [employer] (which disclaims liability or warranty for this information).  If you have questions about a medical condition or this instruction, always ask your healthcare professional. [employer] disclaims any warranty or liability for your use of this information.

## 2018-08-30 PROBLEM — E66.01 SEVERE OBESITY (BMI 35.0-39.9): Status: ACTIVE | Noted: 2018-08-30

## 2022-08-14 ENCOUNTER — APPOINTMENT (OUTPATIENT)
Dept: GENERAL RADIOLOGY | Age: 77
End: 2022-08-14
Payer: MEDICARE

## 2022-08-14 ENCOUNTER — HOSPITAL ENCOUNTER (EMERGENCY)
Age: 77
Discharge: HOME | End: 2022-08-14
Attending: EMERGENCY MEDICINE
Payer: MEDICARE

## 2022-08-14 VITALS
DIASTOLIC BLOOD PRESSURE: 71 MMHG | SYSTOLIC BLOOD PRESSURE: 148 MMHG | RESPIRATION RATE: 18 BRPM | TEMPERATURE: 97.8 F | OXYGEN SATURATION: 98 % | HEART RATE: 68 BPM

## 2022-08-14 DIAGNOSIS — Z91.14 NONCOMPLIANCE WITH MEDICATION REGIMEN: ICD-10-CM

## 2022-08-14 DIAGNOSIS — F22 PARANOID STATES (DELUSIONAL DISORDERS) (HCC): Primary | ICD-10-CM

## 2022-08-14 LAB
ALBUMIN SERPL-MCNC: 3.4 G/DL (ref 3.2–4.6)
ALBUMIN/GLOB SERPL: 0.9 {RATIO} (ref 1.2–3.5)
ALP SERPL-CCNC: 126 U/L (ref 50–136)
ALT SERPL-CCNC: 30 U/L (ref 12–65)
AMPHET UR QL SCN: NEGATIVE
ANION GAP SERPL CALC-SCNC: 5 MMOL/L (ref 7–16)
AST SERPL-CCNC: 32 U/L (ref 15–37)
BARBITURATES UR QL SCN: NEGATIVE
BASOPHILS # BLD: 0 K/UL (ref 0–0.2)
BASOPHILS NFR BLD: 0 % (ref 0–2)
BENZODIAZ UR QL: NEGATIVE
BILIRUB SERPL-MCNC: 1 MG/DL (ref 0.2–1.1)
BILIRUB UR QL: NEGATIVE
BUN SERPL-MCNC: 21 MG/DL (ref 8–23)
CALCIUM SERPL-MCNC: 8.9 MG/DL (ref 8.3–10.4)
CANNABINOIDS UR QL SCN: NEGATIVE
CHLORIDE SERPL-SCNC: 110 MMOL/L (ref 98–107)
CO2 SERPL-SCNC: 27 MMOL/L (ref 21–32)
COCAINE UR QL SCN: NEGATIVE
CREAT SERPL-MCNC: 0.6 MG/DL (ref 0.6–1)
DIFFERENTIAL METHOD BLD: ABNORMAL
EKG ATRIAL RATE: 213 BPM
EKG DIAGNOSIS: NORMAL
EKG Q-T INTERVAL: 424 MS
EKG QRS DURATION: 100 MS
EKG QTC CALCULATION (BAZETT): 458 MS
EKG R AXIS: -33 DEGREES
EKG T AXIS: 73 DEGREES
EKG VENTRICULAR RATE: 70 BPM
EOSINOPHIL # BLD: 0.1 K/UL (ref 0–0.8)
EOSINOPHIL NFR BLD: 1 % (ref 0.5–7.8)
ERYTHROCYTE [DISTWIDTH] IN BLOOD BY AUTOMATED COUNT: 13.2 % (ref 11.9–14.6)
GLOBULIN SER CALC-MCNC: 3.7 G/DL (ref 2.3–3.5)
GLUCOSE SERPL-MCNC: 109 MG/DL (ref 65–100)
GLUCOSE UR QL STRIP.AUTO: NEGATIVE MG/DL
HCT VFR BLD AUTO: 44.1 % (ref 35.8–46.3)
HGB BLD-MCNC: 14.7 G/DL (ref 11.7–15.4)
IMM GRANULOCYTES # BLD AUTO: 0 K/UL (ref 0–0.5)
IMM GRANULOCYTES NFR BLD AUTO: 0 % (ref 0–5)
KETONES UR-MCNC: ABNORMAL MG/DL
LEUKOCYTE ESTERASE UR QL STRIP: NEGATIVE
LYMPHOCYTES # BLD: 2.5 K/UL (ref 0.5–4.6)
LYMPHOCYTES NFR BLD: 44 % (ref 13–44)
MCH RBC QN AUTO: 32.7 PG (ref 26.1–32.9)
MCHC RBC AUTO-ENTMCNC: 33.3 G/DL (ref 31.4–35)
MCV RBC AUTO: 98.2 FL (ref 79.6–97.8)
METHADONE UR QL: NEGATIVE
MONOCYTES # BLD: 0.3 K/UL (ref 0.1–1.3)
MONOCYTES NFR BLD: 6 % (ref 4–12)
NEUTS SEG # BLD: 2.9 K/UL (ref 1.7–8.2)
NEUTS SEG NFR BLD: 50 % (ref 43–78)
NITRITE UR QL: NEGATIVE
NRBC # BLD: 0 K/UL (ref 0–0.2)
OPIATES UR QL: NEGATIVE
PCP UR QL: NEGATIVE
PH UR: 6.5 [PH] (ref 5–9)
PLATELET # BLD AUTO: 98 K/UL (ref 150–450)
PMV BLD AUTO: 10 FL (ref 9.4–12.3)
POTASSIUM SERPL-SCNC: 3.9 MMOL/L (ref 3.5–5.1)
PROT SERPL-MCNC: 7.1 G/DL (ref 6.3–8.2)
PROT UR QL: NEGATIVE MG/DL
RBC # BLD AUTO: 4.49 M/UL (ref 4.05–5.2)
RBC # UR STRIP: ABNORMAL /UL
SERVICE CMNT-IMP: ABNORMAL
SODIUM SERPL-SCNC: 142 MMOL/L (ref 136–145)
SP GR UR: 1.02 (ref 1–1.02)
UROBILINOGEN UR QL: 1 EU/DL (ref 0.2–1)
WBC # BLD AUTO: 5.8 K/UL (ref 4.3–11.1)

## 2022-08-14 PROCEDURE — 93005 ELECTROCARDIOGRAM TRACING: CPT | Performed by: EMERGENCY MEDICINE

## 2022-08-14 PROCEDURE — 80053 COMPREHEN METABOLIC PANEL: CPT

## 2022-08-14 PROCEDURE — 80307 DRUG TEST PRSMV CHEM ANLYZR: CPT

## 2022-08-14 PROCEDURE — 71045 X-RAY EXAM CHEST 1 VIEW: CPT

## 2022-08-14 PROCEDURE — 81003 URINALYSIS AUTO W/O SCOPE: CPT

## 2022-08-14 PROCEDURE — 6370000000 HC RX 637 (ALT 250 FOR IP): Performed by: EMERGENCY MEDICINE

## 2022-08-14 PROCEDURE — 85025 COMPLETE CBC W/AUTO DIFF WBC: CPT

## 2022-08-14 PROCEDURE — 99285 EMERGENCY DEPT VISIT HI MDM: CPT

## 2022-08-14 RX ORDER — OLANZAPINE 5 MG/1
10 TABLET, ORALLY DISINTEGRATING ORAL
Status: COMPLETED | OUTPATIENT
Start: 2022-08-14 | End: 2022-08-14

## 2022-08-14 RX ORDER — OLANZAPINE 10 MG/1
10 TABLET, ORALLY DISINTEGRATING ORAL NIGHTLY
Qty: 7 TABLET | Refills: 0 | Status: SHIPPED | OUTPATIENT
Start: 2022-08-14 | End: 2022-08-21

## 2022-08-14 RX ADMIN — OLANZAPINE 10 MG: 5 TABLET, ORALLY DISINTEGRATING ORAL at 12:23

## 2022-08-14 ASSESSMENT — PAIN - FUNCTIONAL ASSESSMENT: PAIN_FUNCTIONAL_ASSESSMENT: 0-10

## 2022-08-14 ASSESSMENT — PAIN SCALES - GENERAL: PAINLEVEL_OUTOF10: 0

## 2022-08-14 NOTE — ED TRIAGE NOTES
Pt arrives via EMS from home, called out for sick person. Pt reports she has felt dizzy since this morning, as well as shaky, pt believes she was poisoned by [place] that she bought from the grocery store yesterday. Pt also states that she thinks someone is cutting holes into her house and putting CO2 into it, no gas appliances in the house, pt lives alone. Pt denies chest pain, shob, abd pain, blurry/double vision, n/v/d, body aches, fever/chills, headache, dysuria, hematuria. Pt states she went to bed last night about 2230, woke up this morning around 0930 with the dizziness. Pt has equal  strength, no drift in any of her extremities, pupils equal and reactive, no slurred speech or aphasia. Pt states she does not take any daily medications.

## 2022-08-14 NOTE — DISCHARGE INSTRUCTIONS
Please keep your appointment with your doctor on Thursday or Friday this week. Take the new medication once every evening before bed to see if this helps with your symptoms. If you develop new or concerning symptoms return to the emergency department at that time.

## 2022-08-14 NOTE — ED PROVIDER NOTES
[employer] Emergency Department Provider Note                   PCP:                Jazz Degroot MD               Age: 65 y.o. Sex: female       ICD-10-CM    1. Paranoid states (delusional disorders) ([facility] [address]  F22     Persistent      2. Noncompliance with medication regimen  Z91.14           DISPOSITION Decision To Discharge 08/14/2022 02:16:34 PM       MDM  Number of Diagnoses or Management Options  Noncompliance with medication regimen  Paranoid states (delusional disorders) ([facility] [address]  Diagnosis management comments: Suicidal ideations, homicidal ideations, self-inflicted injury,    Schizophrenia, schizoaffective disorder, personality disorder, major depression,   depression with anxiety, depression reactive to situation, depression, anxiety, panic  attack, hyperventilation syndrome, bipolar disorder,    Substance abuse, medication noncompliance, drug abuse, alcohol abuse, alcohol  intoxication,         Amount and/or Complexity of Data Reviewed  Clinical lab tests: ordered and reviewed  Tests in the radiology section of CPT®: ordered and reviewed  Tests in the medicine section of CPT®: reviewed and ordered  Obtain history from someone other than the patient: yes  Review and summarize past medical records: yes  Independent visualization of images, tracings, or specimens: yes         Orders Placed This Encounter   Procedures    XR CHEST PORTABLE    CBC with Auto Differential    Comprehensive Metabolic Panel    Urine Drug Screen    POCT Urine Dipstick    POCT Urinalysis no Micro    EKG 12 Lead        Jose A Frank is a 65 y.o. female who presents to the Emergency Department with chief complaint of    Chief Complaint   Patient presents with    Dizziness      Patient is a 79-year-old female presenting to the emergency department today complaining of feeling nauseous and jittery.   Patient states that she called her son and told them if they would not come and see her she would call the ambulance and come to the emergency department. Patient does have a history of paranoid psychosis. She tells me she is not taking any of her prescribed medications because after she was admitted to an inpatient psychiatric hospital she had a realization that no medications are going to help her and that she just needed to get off of everything and eat clean and do it on her own. The patient says its been over a year. She was seen and evaluated last month by her primary doctor and diagnosed as paranoid psychosis. She has several psychiatric medications prescribed to her. The patient tells me that she thinks the aquafina water that she got yesterday is poisoning her and she is convinced that her apartment is being poisoned with CO2 and that there is a tube going between the apartment next door to hers in order to do this. All other systems reviewed and are negative. Review of Systems   Psychiatric/Behavioral:  Positive for confusion and sleep disturbance. Negative for hallucinations. The patient is not nervous/anxious. All other systems reviewed and are negative. Past Medical History:   Diagnosis Date    Psychiatric disorder     a/d        No past surgical history on file. Family History   Problem Relation Age of Onset    Diabetes Father     Breast Cancer Neg Hx     Hypertension Father     Cancer Mother         Social History     Socioeconomic History    Marital status:    Tobacco Use    Smoking status: Never    Smokeless tobacco: Never   Substance and Sexual Activity    Alcohol use: No    Drug use: No        Allergies: Patient has no known allergies. Previous Medications    PALIPERIDONE PALMITATE ER (INVEGA SUSTENNA) 156 MG/ML RANJITH IM INJECTION    Inject 156 mg into the muscle every 30 days        Vitals signs and nursing note reviewed.    Patient Vitals for the past 4 hrs:   Temp Pulse Resp BP SpO2   08/14/22 1200 -- 74 -- (!) 147/83 96 %   08/14/22 1145 -- 77 -- (!) 144/96 97 %   08/14/22 1130 -- 66 13 (!) 146/74 96 %   08/14/22 1100 -- 95 20 (!) 156/86 95 %   08/14/22 1050 97.8 °F (36.6 °C) 74 12 (!) 154/86 94 %          Physical Exam     GENERAL:The patient has There is no height or weight on file to calculate BMI. Well-hydrated. VITAL SIGNS: Heart rate, blood pressure, respiratory rate reviewed as recorded in  nurse's notes  EYES: Pupils reactive. Extraocular motion intact. No conjunctival redness or drainage. MOUTH/THROAT: Pharynx clear; airway patent. NECK: Supple, no meningeal signs. Trachea midline. No masses or thyromegaly. LUNGS: Breath sounds clear and equal bilaterally no accessory muscle use. CHEST: No deformity  CARDIOVASCULAR: Regular rate and rhythm  ABDOMEN: Soft without tenderness. No palpable masses or organomegaly. No  peritoneal signs. No rigidity. EXTREMITIES: No clubbing or cyanosis. No joint swelling. Normal muscle tone. No  restricted range of motion appreciated. NEUROLOGIC: Sensation is grossly intact. Cranial nerve exam reveals face is  symmetrical, tongue is midline speech is clear. SKIN: No rash or petechiae. Good skin turgor palpated. PSYCHIATRIC: Alert and oriented. Patient does not interact with hallucinations. She is able to focus and answer questions directly. She does have some trouble with her timelines and is not sure exactly how long she has been off her medication. She is convinced that her stove and her neighbor in her apartment are trying to poison her.       Procedures    ED EKG Interpretation  EKG was interpreted in the absence of a cardiologist.    Rate: Rate: Normal  EKG Interpretation: EKG Interpretation: sinus rhythm  ST Segments: Normal ST segments - NO STEMI    Labs Reviewed   CBC WITH AUTO DIFFERENTIAL - Abnormal; Notable for the following components:       Result Value    MCV 98.2 (*)     Platelets 98 (*)     All other components within normal limits   COMPREHENSIVE METABOLIC PANEL - Abnormal; Notable for the following components:    Chloride 110 (*) Anion Gap 5 (*)     Glucose 109 (*)     Globulin 3.7 (*)     Albumin/Globulin Ratio 0.9 (*)     All other components within normal limits   POCT URINALYSIS DIPSTICK - Abnormal; Notable for the following components:    Specific Gravity, Urine, POC 1.025 (*)     Ketones, Urine, POC TRACE (*)     Blood, UA POC Trace Intact (*)     All other components within normal limits   URINE DRUG SCREEN        XR CHEST PORTABLE   Final Result   No acute abnormality. Stable 1.5 cm right lower lung pulmonary   nodule                         ED Course as of 08/14/22 1419   Sun Aug 14, 2022   1135 I spoke with Garret Yanet the patients oldest son and he states that he has been working with his moms doctor but that she refuses to take any of her medications. He is an amputee and can not take care of her. At one point Lauren Palafox the younger brother was her POA but he thinks that the patient went through the legal process to get her ability to make her own decisions back. [KH]   1141 XR CHEST PORTABLE  IMPRESSION:  No acute abnormality. Stable 1.5 cm right lower lung pulmonary  nodule [KH]   1413 Patient is feeling better with the medicine given her in the emergency department. She is unsure about starting new medication but will be given a prescription for 7 days of the Zyprexa. She says she has an appointment to see her doctor on Thursday or Friday this week and will talk to him about the medication at that time [KH]      ED Course User Index  [KH] Jose Key DO        Voice dictation software was used during the making of this note. This software is not perfect and grammatical and other typographical errors may be present. This note has not been completely proofread for errors.        Jose Key DO  08/14/22 [address] 16,   08/14/22 1419

## 2022-08-14 NOTE — ED NOTES
I have reviewed discharge instructions with the patient. The patient verbalized understanding. Patient left ED via Discharge Method: ambulatory to Home with [employer] to her apartment    Opportunity for questions and clarification provided. Patient given 1 scripts. To continue your aftercare when you leave the hospital, you may receive an automated call from our care team to check in on how you are doing. This is a free service and part of our promise to provide the best care and service to meet your aftercare needs.  If you have questions, or wish to unsubscribe from this service please call [phone]. Thank you for Choosing our [facility] Emergency Department.         Alexandr Peterson RN  08/14/22 3921

## 2023-03-12 ENCOUNTER — HOSPITAL ENCOUNTER (EMERGENCY)
Age: 78
Discharge: HOME | End: 2023-03-12
Attending: EMERGENCY MEDICINE
Payer: MEDICARE

## 2023-03-12 VITALS
RESPIRATION RATE: 16 BRPM | OXYGEN SATURATION: 94 % | SYSTOLIC BLOOD PRESSURE: 137 MMHG | DIASTOLIC BLOOD PRESSURE: 93 MMHG | HEART RATE: 75 BPM | TEMPERATURE: 97.4 F

## 2023-03-12 DIAGNOSIS — K52.9 GASTROENTERITIS: Primary | ICD-10-CM

## 2023-03-12 LAB
ALBUMIN SERPL-MCNC: 3.8 G/DL (ref 3.2–4.6)
ALBUMIN/GLOB SERPL: 1 (ref 0.4–1.6)
ALP SERPL-CCNC: 135 U/L (ref 50–136)
ALT SERPL-CCNC: 36 U/L (ref 12–65)
ANION GAP SERPL CALC-SCNC: 1 MMOL/L (ref 2–11)
AST SERPL-CCNC: 39 U/L (ref 15–37)
BASOPHILS # BLD: 0 K/UL (ref 0–0.2)
BASOPHILS NFR BLD: 0 % (ref 0–2)
BILIRUB SERPL-MCNC: 0.9 MG/DL (ref 0.2–1.1)
BUN SERPL-MCNC: 19 MG/DL (ref 8–23)
CALCIUM SERPL-MCNC: 9.4 MG/DL (ref 8.3–10.4)
CHLORIDE SERPL-SCNC: 107 MMOL/L (ref 101–110)
CO2 SERPL-SCNC: 30 MMOL/L (ref 21–32)
CREAT SERPL-MCNC: 0.6 MG/DL (ref 0.6–1)
DIFFERENTIAL METHOD BLD: ABNORMAL
EOSINOPHIL # BLD: 0 K/UL (ref 0–0.8)
EOSINOPHIL NFR BLD: 0 % (ref 0.5–7.8)
ERYTHROCYTE [DISTWIDTH] IN BLOOD BY AUTOMATED COUNT: 13.4 % (ref 11.9–14.6)
GLOBULIN SER CALC-MCNC: 3.7 G/DL (ref 2.8–4.5)
GLUCOSE SERPL-MCNC: 181 MG/DL (ref 65–100)
HCT VFR BLD AUTO: 45.9 % (ref 35.8–46.3)
HGB BLD-MCNC: 15.3 G/DL (ref 11.7–15.4)
IMM GRANULOCYTES # BLD AUTO: 0 K/UL (ref 0–0.5)
IMM GRANULOCYTES NFR BLD AUTO: 0 % (ref 0–5)
LIPASE SERPL-CCNC: 197 U/L (ref 73–393)
LYMPHOCYTES # BLD: 1.8 K/UL (ref 0.5–4.6)
LYMPHOCYTES NFR BLD: 23 % (ref 13–44)
MCH RBC QN AUTO: 32.3 PG (ref 26.1–32.9)
MCHC RBC AUTO-ENTMCNC: 33.3 G/DL (ref 31.4–35)
MCV RBC AUTO: 96.8 FL (ref 82–102)
MONOCYTES # BLD: 0.2 K/UL (ref 0.1–1.3)
MONOCYTES NFR BLD: 3 % (ref 4–12)
NEUTS SEG # BLD: 5.8 K/UL (ref 1.7–8.2)
NEUTS SEG NFR BLD: 74 % (ref 43–78)
NRBC # BLD: 0 K/UL (ref 0–0.2)
PLATELET # BLD AUTO: 102 K/UL (ref 150–450)
PMV BLD AUTO: 10.2 FL (ref 9.4–12.3)
POTASSIUM SERPL-SCNC: 4.1 MMOL/L (ref 3.5–5.1)
PROT SERPL-MCNC: 7.5 G/DL (ref 6.3–8.2)
RBC # BLD AUTO: 4.74 M/UL (ref 4.05–5.2)
SODIUM SERPL-SCNC: 138 MMOL/L (ref 133–143)
WBC # BLD AUTO: 7.8 K/UL (ref 4.3–11.1)

## 2023-03-12 PROCEDURE — 99283 EMERGENCY DEPT VISIT LOW MDM: CPT | Performed by: EMERGENCY MEDICINE

## 2023-03-12 PROCEDURE — 80053 COMPREHEN METABOLIC PANEL: CPT

## 2023-03-12 PROCEDURE — 83690 ASSAY OF LIPASE: CPT

## 2023-03-12 PROCEDURE — 85025 COMPLETE CBC W/AUTO DIFF WBC: CPT

## 2023-03-12 RX ORDER — IBUPROFEN 800 MG/1
400 TABLET ORAL EVERY 8 HOURS PRN
Qty: 30 TABLET | Refills: 1 | Status: SHIPPED | OUTPATIENT
Start: 2023-03-12 | End: 2023-03-12

## 2023-03-12 RX ORDER — 0.9 % SODIUM CHLORIDE 0.9 %
500 INTRAVENOUS SOLUTION INTRAVENOUS
Status: DISCONTINUED | OUTPATIENT
Start: 2023-03-12 | End: 2023-03-12 | Stop reason: HOSPADM

## 2023-03-12 RX ORDER — ONDANSETRON 4 MG/1
4 TABLET, FILM COATED ORAL 3 TIMES DAILY PRN
Qty: 15 TABLET | Refills: 0 | Status: SHIPPED | OUTPATIENT
Start: 2023-03-12

## 2023-03-12 RX ORDER — ONDANSETRON 2 MG/ML
4 INJECTION INTRAMUSCULAR; INTRAVENOUS
Status: DISCONTINUED | OUTPATIENT
Start: 2023-03-12 | End: 2023-03-12 | Stop reason: HOSPADM

## 2023-03-12 NOTE — ED NOTES
Pt has IV zofran and IV fluids ordered. Pt states she is no longer nauseous and does not need fluids. Pt stated \"I don't feel dehydrated and I don't want to put anything in my body that isn't supposed to be there and I'm ready to go home. \" MD notified. Pt to be discharged.       Zoe Baca RN  03/12/23 1935

## 2023-03-12 NOTE — ED TRIAGE NOTES
Pt arrives via [facility] from home for complaint of N/V, dizziness, and generalized tremor. Pt believes she was poisoned or that something is coming for her air vents.

## 2023-03-13 ASSESSMENT — ENCOUNTER SYMPTOMS
VOMITING: 1
NAUSEA: 1
RESPIRATORY NEGATIVE: 1
ABDOMINAL PAIN: 1
DIARRHEA: 0
BACK PAIN: 0

## 2023-03-13 NOTE — ED PROVIDER NOTES
Emergency Department Provider Note                   PCP:                None None               Age: 77 y.o. Sex: female     DISPOSITION Decision To Discharge 03/12/2023 07:12:22 PM       ICD-10-CM    1. Gastroenteritis  K52.9           MEDICAL DECISION MAKING  Complexity of Problems Addressed:  1 or more acute illnesses that pose a threat to life or bodily function. Data Reviewed and Analyzed:  Category 1:     I ordered each unique test.  I reviewed the results of each unique test.        Category 2:   I independently ordered and reviewed the labs    Category 3: Discussion of management or test interpretation. Patient is here with nausea vomiting abdominal cramping after eating a sandwich that tasted funny. I suspect likely foodborne gastroenteritis. At the time of her evaluation she had no abdominal pain. Labs were unremarkable. Offered IV fluid and Zofran but patient declined. She felt okay at the time. We will give Zofran ODT as needed for home. Vital signs were stable. She is stable for discharge. I have a low suspicion for acute intra-abdominal surgical pathology. Recommend to return if she have any worsening symptoms. Risk of Complications and/or Morbidity of Patient Management:  Prescription drug management performed and Patient was discharged risks and benefits of hospitalization were considered     Jennifer Johnson is a 77 y.o. female who presents to the Emergency Department with chief complaint of    Chief Complaint   Patient presents with    Nausea      77 female here with abdominal cramping nausea vomiting. Stated this morning she ate a cheese sandwich. Noted it tasted somewhat better but she did not think much of it. Approximately 2 or so hours after finishing the sandwich she started to feel nauseous and had multiple episodes vomiting. Some mild abdominal cramping but no diarrhea. She denies any fever. She denies any chest pain or shortness of breath.   No UTI symptoms. Review of Systems   Constitutional: Negative. Negative for fever. HENT: Negative. Respiratory: Negative. Cardiovascular: Negative. Gastrointestinal:  Positive for abdominal pain, nausea and vomiting. Negative for diarrhea. Genitourinary: Negative. Musculoskeletal:  Negative for back pain. Skin: Negative. Neurological: Negative. Vitals signs and nursing note reviewed. No data found. Physical Exam  Constitutional:       Appearance: Normal appearance. HENT:      Head: Normocephalic and atraumatic. Nose: Nose normal.      Mouth/Throat:      Mouth: Mucous membranes are moist.   Cardiovascular:      Rate and Rhythm: Normal rate and regular rhythm. Pulses: Normal pulses. Heart sounds: Normal heart sounds. Abdominal:      General: Abdomen is flat. Palpations: Abdomen is soft. Comments: No distention. Hyperactive bowel sounds. No tenderness throughout   Musculoskeletal:         General: Normal range of motion. Cervical back: Normal range of motion. Skin:     General: Skin is warm. Neurological:      General: No focal deficit present. Mental Status: She is alert and oriented to person, place, and time. Mental status is at baseline. Procedures     Orders Placed This Encounter   Procedures    CBC with Diff    CMP    Lipase        Medications - No data to display    Discharge Medication List as of 3/12/2023  7:28 PM        START taking these medications    Details   ondansetron (ZOFRAN) 4 MG tablet Take 1 tablet by mouth 3 times daily as needed for Nausea or Vomiting, Disp-15 tablet, R-0Print              Past Medical History:   Diagnosis Date    Psychiatric disorder     a/d        No past surgical history on file.      Family History   Problem Relation Age of Onset    Diabetes Father     Breast Cancer Neg Hx     Hypertension Father     Cancer Mother         Social History     Socioeconomic History    Marital status:  Tobacco Use    Smoking status: Never    Smokeless tobacco: Never   Substance and Sexual Activity    Alcohol use: No    Drug use: No        Allergies: Patient has no known allergies.     Discharge Medication List as of 3/12/2023  7:28 PM        CONTINUE these medications which have NOT CHANGED    Details   OLANZapine zydis (ZYPREXA ZYDIS) 10 MG disintegrating tablet Take 1 tablet by mouth nightly for 7 days, Disp-7 tablet, R-0Print      paliperidone palmitate ER (INVEGA SUSTENNA) 156 MG/ML RANJITH IM injection Inject 156 mg into the muscle every 30 days, IntraMUSCular, EVERY 30 DAYS Starting Tue 2/25/2020, Historical Med              Results for orders placed or performed during the hospital encounter of 03/12/23   CBC with Diff   Result Value Ref Range    WBC 7.8 4.3 - 11.1 K/uL    RBC 4.74 4.05 - 5.2 M/uL    Hemoglobin 15.3 11.7 - 15.4 g/dL    Hematocrit 45.9 35.8 - 46.3 %    MCV 96.8 82 - 102 FL    MCH 32.3 26.1 - 32.9 PG    MCHC 33.3 31.4 - 35.0 g/dL    RDW 13.4 11.9 - 14.6 %    Platelets 029 (L) 767 - 450 K/uL    MPV 10.2 9.4 - 12.3 FL    nRBC 0.00 0.0 - 0.2 K/uL    Differential Type AUTOMATED      Seg Neutrophils 74 43 - 78 %    Lymphocytes 23 13 - 44 %    Monocytes 3 (L) 4.0 - 12.0 %    Eosinophils % 0 (L) 0.5 - 7.8 %    Basophils 0 0.0 - 2.0 %    Immature Granulocytes 0 0.0 - 5.0 %    Segs Absolute 5.8 1.7 - 8.2 K/UL    Absolute Lymph # 1.8 0.5 - 4.6 K/UL    Absolute Mono # 0.2 0.1 - 1.3 K/UL    Absolute Eos # 0.0 0.0 - 0.8 K/UL    Basophils Absolute 0.0 0.0 - 0.2 K/UL    Absolute Immature Granulocyte 0.0 0.0 - 0.5 K/UL   CMP   Result Value Ref Range    Sodium 138 133 - 143 mmol/L    Potassium 4.1 3.5 - 5.1 mmol/L    Chloride 107 101 - 110 mmol/L    CO2 30 21 - 32 mmol/L    Anion Gap 1 (L) 2 - 11 mmol/L    Glucose 181 (H) 65 - 100 mg/dL    BUN 19 8 - 23 MG/DL    Creatinine 0.60 0.6 - 1.0 MG/DL    Est, Glom Filt Rate >60 >60 ml/min/1.73m2    Calcium 9.4 8.3 - 10.4 MG/DL    Total Bilirubin 0.9 0.2 - 1.1 MG/DL ALT 36 12 - 65 U/L    AST 39 (H) 15 - 37 U/L    Alk Phosphatase 135 50 - 136 U/L    Total Protein 7.5 6.3 - 8.2 g/dL    Albumin 3.8 3.2 - 4.6 g/dL    Globulin 3.7 2.8 - 4.5 g/dL    Albumin/Globulin Ratio 1.0 0.4 - 1.6     Lipase   Result Value Ref Range    Lipase 197 73 - 393 U/L        No orders to display                     Voice dictation software was used during the making of this note. This software is not perfect and grammatical and other typographical errors may be present. This note has not been completely proofread for errors.      Pato Leos MD  03/13/23 5141

## 2025-04-09 ENCOUNTER — HOSPITAL ENCOUNTER (EMERGENCY)
Age: 80
Discharge: HOME | End: 2025-04-10
Attending: STUDENT IN AN ORGANIZED HEALTH CARE EDUCATION/TRAINING PROGRAM
Payer: MEDICARE

## 2025-04-09 DIAGNOSIS — F22 DELUSIONS (HCC): Primary | ICD-10-CM

## 2025-04-09 DIAGNOSIS — F20.0 PARANOID SCHIZOPHRENIA (HCC): ICD-10-CM

## 2025-04-09 LAB
ALBUMIN SERPL-MCNC: 3.6 G/DL (ref 3.2–4.6)
ALBUMIN/GLOB SERPL: 1.1 (ref 1–1.9)
ALP SERPL-CCNC: 127 U/L (ref 35–104)
ALT SERPL-CCNC: 24 U/L (ref 8–45)
AMPHET UR QL SCN: NEGATIVE
ANION GAP SERPL CALC-SCNC: 11 MMOL/L (ref 7–16)
APPEARANCE UR: ABNORMAL
AST SERPL-CCNC: 39 U/L (ref 15–37)
BACTERIA URNS QL MICRO: ABNORMAL /HPF
BARBITURATES UR QL SCN: NEGATIVE
BASOPHILS # BLD: 0.02 K/UL (ref 0–0.2)
BASOPHILS NFR BLD: 0.2 % (ref 0–2)
BENZODIAZ UR QL: NEGATIVE
BILIRUB SERPL-MCNC: 1.1 MG/DL (ref 0–1.2)
BILIRUB UR QL: NEGATIVE
BUN SERPL-MCNC: 20 MG/DL (ref 8–23)
CALCIUM SERPL-MCNC: 9.5 MG/DL (ref 8.8–10.2)
CANNABINOIDS UR QL SCN: NEGATIVE
CASTS URNS QL MICRO: 0 /LPF
CHLORIDE SERPL-SCNC: 105 MMOL/L (ref 98–107)
CO2 SERPL-SCNC: 26 MMOL/L (ref 20–29)
COCAINE UR QL SCN: NEGATIVE
COLOR UR: ABNORMAL
CREAT SERPL-MCNC: 0.64 MG/DL (ref 0.6–1.1)
CRYSTALS URNS QL MICRO: 0 /LPF
DIFFERENTIAL METHOD BLD: ABNORMAL
EOSINOPHIL # BLD: 0.03 K/UL (ref 0–0.8)
EOSINOPHIL NFR BLD: 0.3 % (ref 0.5–7.8)
EPI CELLS #/AREA URNS HPF: ABNORMAL /HPF
ERYTHROCYTE [DISTWIDTH] IN BLOOD BY AUTOMATED COUNT: 13.1 % (ref 11.9–14.6)
GLOBULIN SER CALC-MCNC: 3.2 G/DL (ref 2.3–3.5)
GLUCOSE SERPL-MCNC: 102 MG/DL (ref 70–99)
GLUCOSE UR STRIP.AUTO-MCNC: NEGATIVE MG/DL
HCT VFR BLD AUTO: 44.7 % (ref 35.8–46.3)
HGB BLD-MCNC: 14.8 G/DL (ref 11.7–15.4)
HGB UR QL STRIP: NEGATIVE
IMM GRANULOCYTES # BLD AUTO: 0.02 K/UL (ref 0–0.5)
IMM GRANULOCYTES NFR BLD AUTO: 0.2 % (ref 0–5)
KETONES UR QL STRIP.AUTO: 15 MG/DL
LEUKOCYTE ESTERASE UR QL STRIP.AUTO: ABNORMAL
LYMPHOCYTES # BLD: 3.23 K/UL (ref 0.5–4.6)
LYMPHOCYTES NFR BLD: 33.8 % (ref 13–44)
MCH RBC QN AUTO: 32.2 PG (ref 26.1–32.9)
MCHC RBC AUTO-ENTMCNC: 33.1 G/DL (ref 31.4–35)
MCV RBC AUTO: 97.4 FL (ref 82–102)
METHADONE UR QL: NEGATIVE
MONOCYTES # BLD: 0.49 K/UL (ref 0.1–1.3)
MONOCYTES NFR BLD: 5.1 % (ref 4–12)
MUCOUS THREADS URNS QL MICRO: 0 /LPF
NEUTS SEG # BLD: 5.78 K/UL (ref 1.7–8.2)
NEUTS SEG NFR BLD: 60.4 % (ref 43–78)
NITRITE UR QL STRIP.AUTO: NEGATIVE
NRBC # BLD: 0 K/UL (ref 0–0.2)
OPIATES UR QL: NEGATIVE
OTHER OBSERVATIONS: ABNORMAL
PCP UR QL: NEGATIVE
PH UR STRIP: 7 (ref 5–9)
PLATELET # BLD AUTO: 99 K/UL (ref 150–450)
PMV BLD AUTO: 10 FL (ref 9.4–12.3)
POTASSIUM SERPL-SCNC: 4.6 MMOL/L (ref 3.5–5.1)
PROT SERPL-MCNC: 6.9 G/DL (ref 6.3–8.2)
PROT UR STRIP-MCNC: NEGATIVE MG/DL
RBC # BLD AUTO: 4.59 M/UL (ref 4.05–5.2)
RBC #/AREA URNS HPF: ABNORMAL /HPF
SODIUM SERPL-SCNC: 141 MMOL/L (ref 136–145)
SP GR UR REFRACTOMETRY: 1.01 (ref 1–1.02)
URINE CULTURE IF INDICATED: ABNORMAL
UROBILINOGEN UR QL STRIP.AUTO: 1 EU/DL (ref 0.2–1)
WBC # BLD AUTO: 9.6 K/UL (ref 4.3–11.1)
WBC URNS QL MICRO: ABNORMAL /HPF

## 2025-04-09 PROCEDURE — 85025 COMPLETE CBC W/AUTO DIFF WBC: CPT

## 2025-04-09 PROCEDURE — 87086 URINE CULTURE/COLONY COUNT: CPT

## 2025-04-09 PROCEDURE — 81001 URINALYSIS AUTO W/SCOPE: CPT

## 2025-04-09 PROCEDURE — 99283 EMERGENCY DEPT VISIT LOW MDM: CPT

## 2025-04-09 PROCEDURE — 80307 DRUG TEST PRSMV CHEM ANLYZR: CPT

## 2025-04-09 PROCEDURE — 80053 COMPREHEN METABOLIC PANEL: CPT

## 2025-04-09 RX ORDER — IBUPROFEN 400 MG/1
400 TABLET, FILM COATED ORAL EVERY 6 HOURS PRN
Status: DISCONTINUED | OUTPATIENT
Start: 2025-04-09 | End: 2025-04-10 | Stop reason: HOSPADM

## 2025-04-09 RX ORDER — ONDANSETRON 8 MG/1
8 TABLET, ORALLY DISINTEGRATING ORAL EVERY 8 HOURS PRN
Status: DISCONTINUED | OUTPATIENT
Start: 2025-04-09 | End: 2025-04-10 | Stop reason: HOSPADM

## 2025-04-09 RX ORDER — TRAZODONE HYDROCHLORIDE 50 MG/1
50 TABLET ORAL NIGHTLY
Status: DISCONTINUED | OUTPATIENT
Start: 2025-04-10 | End: 2025-04-10 | Stop reason: HOSPADM

## 2025-04-09 RX ORDER — MAGNESIUM HYDROXIDE/ALUMINUM HYDROXICE/SIMETHICONE 120; 1200; 1200 MG/30ML; MG/30ML; MG/30ML
30 SUSPENSION ORAL EVERY 6 HOURS PRN
Status: DISCONTINUED | OUTPATIENT
Start: 2025-04-09 | End: 2025-04-10 | Stop reason: HOSPADM

## 2025-04-09 RX ORDER — ACETAMINOPHEN 500 MG
1000 TABLET ORAL EVERY 6 HOURS PRN
Status: DISCONTINUED | OUTPATIENT
Start: 2025-04-09 | End: 2025-04-10 | Stop reason: HOSPADM

## 2025-04-09 ASSESSMENT — PAIN SCALES - GENERAL: PAINLEVEL_OUTOF10: 0

## 2025-04-09 ASSESSMENT — LIFESTYLE VARIABLES
HOW OFTEN DO YOU HAVE A DRINK CONTAINING ALCOHOL: MONTHLY OR LESS
HOW MANY STANDARD DRINKS CONTAINING ALCOHOL DO YOU HAVE ON A TYPICAL DAY: 1 OR 2

## 2025-04-09 NOTE — ED TRIAGE NOTES
Pt  brought in by EMS from PCP office c/o paranoid delusions. Per EMS pt reported to PCP that \"people were sneaking into her home and injecting her with unknown medications and that she is being wirelessly shocked by these same individuals while lying in bed.\" EMS reports pt's PCP had not seen pt in over three years and had never been compliant with prescribed medications.

## 2025-04-09 NOTE — ED NOTES
Pt refuses IV, states that she would rather have the butterfly needle.      Carl Carney, RN  04/09/25 9841

## 2025-04-09 NOTE — ED NOTES
Patient refusing to be stuck again for lab work. Educated patient on importance of checking extra lab work, patient still refused. Sandro GTZ notified.     Myrtle Sun, RN  04/09/25 2000

## 2025-04-09 NOTE — ED NOTES
Bedside shift report received from Carl POOLE. Assuming care of patient at this time.      Myrtle Sun, RN  04/09/25 0222

## 2025-04-09 NOTE — ED NOTES
Pt able to provide urine sample on her own. No straight catheter needed.     Carl Carney, RN  04/09/25 6266

## 2025-04-09 NOTE — ED NOTES
Constant Observer Yes - Name: Hillary Ball Observer Oriented yes   High risk patients are in line of sight at all times Yes   Excess equipment/medical supplies not necessary for the care of the patient removed Yes   All sharp or dangerous objects are removed from room: including but not limited to belts, pens & pencils, needles, medications, cosmetics, lighters, matches, nail files, watches, necklaces, glass objects, razors, razor blades, knives, aerosol sprays, drawstring pants, shoes, cords (telephone, call bells, etc.) cleaning wipes or other cleaning items, aluminum cans, not permanently attached wall décor Yes   Telephone/cell phone removed as well as TV remote (batteries can be swallowed) Yes   Patient belongings removed and labeled at nurses station Yes   Excess linen is removed from room Yes   All plastic bags are removed from the room and replaced with paper trash bags Yes   Patient is in paper scrubs or appropriate gown and using hospital socks with rubber soles Yes   No metal, hard eating utensils or hard plates are on meal tray Yes   Remove all cleaning agents used by [employer] Yes   If Crucifix is hanging on a nail, remove Crucifix as well as the nail Yes     Patient denies SI/HI at this time. Patient placed in paper scrubs with belongings secured.     *If any question above is answered \"No,\" documentation is required.        Myrtle Sun RN  04/09/25 1921

## 2025-04-10 VITALS
BODY MASS INDEX: 30.21 KG/M2 | HEART RATE: 82 BPM | OXYGEN SATURATION: 94 % | HEIGHT: 61 IN | RESPIRATION RATE: 16 BRPM | DIASTOLIC BLOOD PRESSURE: 103 MMHG | TEMPERATURE: 97.9 F | SYSTOLIC BLOOD PRESSURE: 146 MMHG | WEIGHT: 160 LBS

## 2025-04-10 PROBLEM — E66.01 SEVERE OBESITY WITH BODY MASS INDEX (BMI) OF 35.0 TO 39.9 WITH SERIOUS COMORBIDITY: Status: ACTIVE | Noted: 2018-08-30

## 2025-04-10 PROBLEM — F22 PARANOID PSYCHOSIS (HCC): Status: ACTIVE | Noted: 2022-06-01
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
## 2025-04-10 NOTE — ED NOTES
Patient resting at this time. Respirations are unlabored and even. No signs of distress noted. Safety precautions in place. Sitter at bedside.     Myrtle Sun RN  04/09/25 9338

## 2025-04-10 NOTE — ED NOTES
Arranged rideshare to transport patient  to [address] [place] [address] [address]  AM.     Rosibel Degroot  04/10/25 0145

## 2025-04-10 NOTE — ED NOTES
Patient resting at this time. Respirations are unlabored and even. No signs of distress noted. Safety precautions in place. Sitter at bedside.     Myrtle Sun RN  04/09/25 7129

## 2025-04-10 NOTE — ED NOTES
Patient resting at this time. Respirations are unlabored and even. No signs of distress noted. Safety precautions in place. Sitter at bedside.       Myrtle Sun RN  04/09/25 1922

## 2025-04-10 NOTE — ED PROVIDER NOTES
Emergency Department Provider Note       PCP: Deion Henson Jr., MD   Age: 80 y.o.   Sex: female     DISPOSITION Behavioral Health Hold 04/09/2025 08:09:12 PM    ICD-10-CM    1. Delusions (HCC)  F22           Medical Decision Making     80-year-old female presents to the emergency department after being seen by her primary care physician for the first time in the last 3 years.  Patient is concerns for delusions of someone injecting substances into her body.  Reviewing EMR she has had similar presentation approximately 3 years ago.  Per PCPs note, patient refuses any antipsychotic medication for management.  Patient denies suicidal or homicidal ideation.  She currently does not appear to be a harm to self or others.  Basic blood work obtained shows no obvious explanation for her delusions.  No evidence of UTI.  Additional blood work was ordered but patient is refusing.  Will consult psychiatry for evaluation.  Psychiatry consult pending as of 9:40 PM, Dr. Serna made aware and will follow up after psychiatry evaluates patient.       1 chronic illness with exacerbation.    I independently ordered and reviewed each unique test.    I reviewed external records: provider visit note from PCP.           The management of this patient was discussed with an external consultant.            History     80-year-old female presents to the emergency department after being seen by her primary care physician for the first time in the last 3 years.  Patient is concerns for delusions of someone injecting substances into her body.  Reviewing EMR she has had similar presentation approximately 3 years ago.  Per PCPs note, patient refuses any antipsychotic medication for management.  Patient denies suicidal or homicidal ideation.  She currently does not appear to be a harm to self or others.        ROS     Review of Systems     Physical Exam     Vitals signs and nursing note reviewed:  Vitals:    04/09/25 1648 04/09/25 1709

## 2025-04-10 NOTE — ED NOTES
Patient lying in bed awake at this time. Respirations are unlabored and even. No signs of distress noted. Safety precautions in place. Sitter at bedside.     Myrtle Sun RN  04/09/25 2003

## 2025-04-10 NOTE — ED PROVIDER NOTES
Emergency Department Provider Signout / Continuation of Care Note         DISPOSITION Decision To Discharge 04/10/2025 01:39:47 AM       ICD-10-CM    1. Delusions (HCC)  F22       2. Paranoid schizophrenia (HCC)  F20.0           The patient's care was signed out to me at shift change.      Final Plan      Assumed care from Dr. Serna at shift change.  80-year-old female with history of paranoia bipolar disorder  Presented from her primary care doctor's office today for psychiatric evaluation  Telepsychiatry has evaluated the patient and cleared her for discharge.  The provider stated that he was able to contact the patient's son to discuss her situation in detail as well  He states that the son is comfortable with the patient being discharged back to her home  All of her symptoms are chronic and she is never acted on any of the delusions in a harmful way  Patient will be discharged and referred back to her primary care doctor for follow-up as well as [facility]                                      Gerardo Clifford MD  04/10/25 0155

## 2025-04-10 NOTE — ED NOTES
Patient resting at this time. Respirations are unlabored and even. No signs of distress noted. Safety precautions in place. Sitter at bedside.     Myrtle Sun RN  04/10/25 [id number]

## 2025-04-10 NOTE — DISCHARGE INSTRUCTIONS
Take all your current medications  Call your doctor in the morning for follow-up visit  Drink plenty of fluids      Return to ER for any worsening symptoms or new problems which may arise

## 2025-04-12 LAB
BACTERIA SPEC CULT: NORMAL
SERVICE CMNT-IMP: NORMAL